# Patient Record
Sex: FEMALE | HISPANIC OR LATINO | Employment: OTHER | URBAN - METROPOLITAN AREA
[De-identification: names, ages, dates, MRNs, and addresses within clinical notes are randomized per-mention and may not be internally consistent; named-entity substitution may affect disease eponyms.]

---

## 2017-07-11 ENCOUNTER — HOSPITAL ENCOUNTER (OUTPATIENT)
Dept: MAMMOGRAPHY | Facility: HOSPITAL | Age: 72
Discharge: HOME | End: 2017-07-11
Attending: FAMILY MEDICINE | Admitting: FAMILY MEDICINE

## 2018-05-31 ENCOUNTER — HOSPITAL ENCOUNTER (OUTPATIENT)
Dept: CARDIOLOGY | Facility: HOSPITAL | Age: 73
Discharge: HOME | End: 2018-05-31
Attending: INTERNAL MEDICINE | Admitting: INTERNAL MEDICINE

## 2018-07-12 ENCOUNTER — HOSPITAL ENCOUNTER (OUTPATIENT)
Dept: MAMMOGRAPHY | Facility: HOSPITAL | Age: 73
Discharge: HOME | End: 2018-07-12
Attending: FAMILY MEDICINE | Admitting: FAMILY MEDICINE

## 2018-12-26 ENCOUNTER — HOSPITAL ENCOUNTER (OUTPATIENT)
Dept: GENERAL RADIOLOGY | Facility: HOSPITAL | Age: 73
Discharge: HOME | End: 2018-12-26
Attending: FAMILY MEDICINE | Admitting: FAMILY MEDICINE

## 2019-02-06 ENCOUNTER — HOSPITAL ENCOUNTER (OUTPATIENT)
Dept: PHYSICAL THERAPY | Facility: HOSPITAL | Age: 74
Setting detail: RECURRING SERIES
Discharge: HOME | End: 2019-03-07
Attending: PHYSICIAN ASSISTANT | Admitting: PHYSICIAN ASSISTANT

## 2019-03-26 ENCOUNTER — HOSPITAL ENCOUNTER (OUTPATIENT)
Dept: URGENT CARE | Facility: CLINIC | Age: 74
Discharge: HOME | End: 2019-03-26
Attending: FAMILY MEDICINE | Admitting: FAMILY MEDICINE

## 2019-06-01 ENCOUNTER — TRANSCRIBE ORDERS (OUTPATIENT)
Dept: ADMINISTRATIVE | Facility: HOSPITAL | Age: 74
End: 2019-06-01

## 2019-06-01 DIAGNOSIS — Z12.31 BREAST CANCER SCREENING BY MAMMOGRAM: Primary | ICD-10-CM

## 2019-06-01 DIAGNOSIS — Z91.89 PERSONAL HISTORY OF POISONING, PRESENTING HAZARDS TO HEALTH: Primary | ICD-10-CM

## 2019-06-01 DIAGNOSIS — Z12.39 SCREENING BREAST EXAMINATION: ICD-10-CM

## 2019-06-01 DIAGNOSIS — Z91.89 AT RISK FOR DECREASED BONE DENSITY: ICD-10-CM

## 2019-07-15 ENCOUNTER — HOSPITAL ENCOUNTER (OUTPATIENT)
Dept: MAMMOGRAPHY | Facility: HOSPITAL | Age: 74
Discharge: HOME | End: 2019-07-15

## 2019-07-15 ENCOUNTER — HOSPITAL ENCOUNTER (OUTPATIENT)
Dept: BONE DENSITY | Facility: HOSPITAL | Age: 74
Discharge: HOME | End: 2019-07-15
Admitting: FAMILY MEDICINE

## 2019-07-15 DIAGNOSIS — Z91.89 AT RISK FOR DECREASED BONE DENSITY: ICD-10-CM

## 2019-07-15 DIAGNOSIS — Z12.31 BREAST CANCER SCREENING BY MAMMOGRAM: ICD-10-CM

## 2019-07-15 PROCEDURE — 77067 SCR MAMMO BI INCL CAD: CPT

## 2019-07-15 PROCEDURE — 77063 BREAST TOMOSYNTHESIS BI: CPT

## 2019-07-15 PROCEDURE — 77080 DXA BONE DENSITY AXIAL: CPT

## 2019-09-09 ENCOUNTER — APPOINTMENT (OUTPATIENT)
Dept: MRI IMAGING | Facility: HOSPITAL | Age: 74
End: 2019-09-09

## 2019-09-09 ENCOUNTER — HOSPITAL ENCOUNTER (EMERGENCY)
Facility: HOSPITAL | Age: 74
Discharge: HOME | End: 2019-09-09
Attending: EMERGENCY MEDICINE | Admitting: EMERGENCY MEDICINE

## 2019-09-09 VITALS
RESPIRATION RATE: 14 BRPM | HEART RATE: 70 BPM | SYSTOLIC BLOOD PRESSURE: 136 MMHG | BODY MASS INDEX: 29.91 KG/M2 | HEIGHT: 59 IN | DIASTOLIC BLOOD PRESSURE: 57 MMHG | OXYGEN SATURATION: 97 % | WEIGHT: 148.37 LBS | TEMPERATURE: 97.8 F

## 2019-09-09 DIAGNOSIS — H81.10 BENIGN PAROXYSMAL POSITIONAL VERTIGO, UNSPECIFIED LATERALITY: Primary | ICD-10-CM

## 2019-09-09 DIAGNOSIS — E87.6 HYPOKALEMIA: ICD-10-CM

## 2019-09-09 LAB
ANION GAP SERPL CALCULATED.3IONS-SCNC: 13.4 MMOL/L (ref 5–15)
BASOPHILS # BLD AUTO: 0.1 10*3/MM3 (ref 0–0.2)
BASOPHILS NFR BLD AUTO: 0.6 % (ref 0–1.5)
BUN BLD-MCNC: 9 MG/DL (ref 8–20)
BUN/CREAT SERPL: 18 (ref 5.4–26.2)
CALCIUM SPEC-SCNC: 9.2 MG/DL (ref 8.9–10.3)
CHLORIDE SERPL-SCNC: 103 MMOL/L (ref 101–111)
CO2 SERPL-SCNC: 25 MMOL/L (ref 22–32)
CREAT BLD-MCNC: 0.5 MG/DL (ref 0.4–1)
DEPRECATED RDW RBC AUTO: 41.1 FL (ref 37–54)
EOSINOPHIL # BLD AUTO: 0.1 10*3/MM3 (ref 0–0.4)
EOSINOPHIL NFR BLD AUTO: 0.6 % (ref 0.3–6.2)
ERYTHROCYTE [DISTWIDTH] IN BLOOD BY AUTOMATED COUNT: 13.8 % (ref 12.3–15.4)
GFR SERPL CREATININE-BSD FRML MDRD: 121 ML/MIN/1.73
GLUCOSE BLD-MCNC: 167 MG/DL (ref 65–99)
HCT VFR BLD AUTO: 41.4 % (ref 34–46.6)
HGB BLD-MCNC: 14.1 G/DL (ref 12–15.9)
LYMPHOCYTES # BLD AUTO: 1.3 10*3/MM3 (ref 0.7–3.1)
LYMPHOCYTES NFR BLD AUTO: 11.5 % (ref 19.6–45.3)
MCH RBC QN AUTO: 28.3 PG (ref 26.6–33)
MCHC RBC AUTO-ENTMCNC: 33.9 G/DL (ref 31.5–35.7)
MCV RBC AUTO: 83.6 FL (ref 79–97)
MONOCYTES # BLD AUTO: 0.3 10*3/MM3 (ref 0.1–0.9)
MONOCYTES NFR BLD AUTO: 2.4 % (ref 5–12)
NEUTROPHILS # BLD AUTO: 9.4 10*3/MM3 (ref 1.7–7)
NEUTROPHILS NFR BLD AUTO: 84.9 % (ref 42.7–76)
NRBC BLD AUTO-RTO: 0.2 /100 WBC (ref 0–0.2)
PLATELET # BLD AUTO: 212 10*3/MM3 (ref 140–450)
PMV BLD AUTO: 9.1 FL (ref 6–12)
POTASSIUM BLD-SCNC: 3.4 MMOL/L (ref 3.6–5.1)
RBC # BLD AUTO: 4.96 10*6/MM3 (ref 3.77–5.28)
SODIUM BLD-SCNC: 138 MMOL/L (ref 136–144)
WBC NRBC COR # BLD: 11 10*3/MM3 (ref 3.4–10.8)

## 2019-09-09 PROCEDURE — 70551 MRI BRAIN STEM W/O DYE: CPT

## 2019-09-09 PROCEDURE — 99284 EMERGENCY DEPT VISIT MOD MDM: CPT

## 2019-09-09 PROCEDURE — 85025 COMPLETE CBC W/AUTO DIFF WBC: CPT | Performed by: EMERGENCY MEDICINE

## 2019-09-09 PROCEDURE — 80048 BASIC METABOLIC PNL TOTAL CA: CPT | Performed by: EMERGENCY MEDICINE

## 2019-09-09 RX ORDER — MECLIZINE HYDROCHLORIDE 25 MG/1
25 TABLET ORAL EVERY 6 HOURS PRN
Qty: 28 TABLET | Refills: 0 | Status: SHIPPED | OUTPATIENT
Start: 2019-09-09 | End: 2023-04-04

## 2019-09-09 RX ORDER — MECLIZINE HYDROCHLORIDE 25 MG/1
25 TABLET ORAL ONCE
Status: COMPLETED | OUTPATIENT
Start: 2019-09-09 | End: 2019-09-09

## 2019-09-09 RX ORDER — SODIUM CHLORIDE 0.9 % (FLUSH) 0.9 %
10 SYRINGE (ML) INJECTION AS NEEDED
Status: DISCONTINUED | OUTPATIENT
Start: 2019-09-09 | End: 2019-09-09 | Stop reason: HOSPADM

## 2019-09-09 RX ORDER — POTASSIUM CHLORIDE 20 MEQ/1
20 TABLET, EXTENDED RELEASE ORAL DAILY
Qty: 7 TABLET | Refills: 0 | Status: SHIPPED | OUTPATIENT
Start: 2019-09-09 | End: 2019-09-16

## 2019-09-09 RX ADMIN — MECLIZINE HYDROCLORIDE 25 MG: 25 TABLET ORAL at 16:23

## 2019-09-09 NOTE — ED PROVIDER NOTES
Subjective   History of Present Illness  Context: 72-year-old female presents with dizziness.  Daughter reports that she is off balance when she first gets up to start walking.  She complained of some spinning with lying flat she has had nausea no vomiting no headache.  She has had no speech difficulty no extremity numbness or weakness.  She does not complain of headache.  She states symptoms started this morning.  Location:  Quality: Dizziness  Duration: today  Timing: Constant  Severity: Moderate   Modifying factors: Worse with position change or lying flat  Associated signs and symptoms: Nausea    Review of Systems  For headache speech difficulty visual difficulty numbness weakness paresthesia of extremities hearing change or tinnitus  Past Medical History:   Diagnosis Date   • Breast cancer (CMS/HCC)     BOTH 15 YEARS   • TIA (transient ischemic attack) u       Allergies   Allergen Reactions   • Ibuprofen Hives   • Penicillins Hives       Past Surgical History:   Procedure Laterality Date   • BREAST BIOPSY     • BREAST LUMPECTOMY  15 YEARS AGO    BOTH BREASTS       History reviewed. No pertinent family history.    Social History     Socioeconomic History   • Marital status:      Spouse name: Not on file   • Number of children: Not on file   • Years of education: Not on file   • Highest education level: Not on file     Prior to Admission medications    Not on File     From review of records medications have included amlodipine aspirin Plavix metoprolol Crestor      Objective   Physical Exam  72-year-old female awake alert.  Generally well-developed malnourished.  Pupils equal round react to light.  Visual fields full.  Oropharynx clear neck supple chest clear equal breath sounds cardiovascular regular rate and rhythm abdomen soft nontender neurologic exam clear 2 through 12 grossly intact hands without pronator drift and is intact speech clear.  And I stroke scale 0.  Patient is noted to have positive  Garrison-Hallpike test with a nystagmus fast phase to the left.  Procedures           ED Course        Results for orders placed or performed during the hospital encounter of 09/09/19   Basic Metabolic Panel   Result Value Ref Range    Glucose 167 (H) 65 - 99 mg/dL    BUN 9 8 - 20 mg/dL    Creatinine 0.50 0.40 - 1.00 mg/dL    Sodium 138 136 - 144 mmol/L    Potassium 3.4 (L) 3.6 - 5.1 mmol/L    Chloride 103 101 - 111 mmol/L    CO2 25.0 22.0 - 32.0 mmol/L    Calcium 9.2 8.9 - 10.3 mg/dL    eGFR Non African Amer 121 >60 mL/min/1.73    BUN/Creatinine Ratio 18.0 5.4 - 26.2    Anion Gap 13.4 5.0 - 15.0 mmol/L   CBC Auto Differential   Result Value Ref Range    WBC 11.00 (H) 3.40 - 10.80 10*3/mm3    RBC 4.96 3.77 - 5.28 10*6/mm3    Hemoglobin 14.1 12.0 - 15.9 g/dL    Hematocrit 41.4 34.0 - 46.6 %    MCV 83.6 79.0 - 97.0 fL    MCH 28.3 26.6 - 33.0 pg    MCHC 33.9 31.5 - 35.7 g/dL    RDW 13.8 12.3 - 15.4 %    RDW-SD 41.1 37.0 - 54.0 fl    MPV 9.1 6.0 - 12.0 fL    Platelets 212 140 - 450 10*3/mm3    Neutrophil % 84.9 (H) 42.7 - 76.0 %    Lymphocyte % 11.5 (L) 19.6 - 45.3 %    Monocyte % 2.4 (L) 5.0 - 12.0 %    Eosinophil % 0.6 0.3 - 6.2 %    Basophil % 0.6 0.0 - 1.5 %    Neutrophils, Absolute 9.40 (H) 1.70 - 7.00 10*3/mm3    Lymphocytes, Absolute 1.30 0.70 - 3.10 10*3/mm3    Monocytes, Absolute 0.30 0.10 - 0.90 10*3/mm3    Eosinophils, Absolute 0.10 0.00 - 0.40 10*3/mm3    Basophils, Absolute 0.10 0.00 - 0.20 10*3/mm3    nRBC 0.2 0.0 - 0.2 /100 WBC     Mri Brain Without Contrast    Result Date: 9/9/2019   1. Stable exam, negative for acute ischemia. 2. Chronic signal abnormality mainly in the white matter is unchanged and most likely due to chronic small vessel disease.  Electronically Signed By-Madison Cohen On:9/9/2019 5:24 PM This report was finalized on [id number] by  Madison Cohen, .    Medications   sodium chloride 0.9 % flush 10 mL (not administered)   meclizine (ANTIVERT) tablet 25 mg (25 mg Oral Given 9/9/19 7048)  "    /70   Pulse 76   Temp 97.9 °F (36.6 °C) (Oral)   Resp 17   Ht 149.9 cm (59\")   Wt 67.3 kg (148 lb 5.9 oz)   SpO2 99%   Breastfeeding? No   BMI 29.97 kg/m²             [facility]  Chart review:   Comorbidity: As per past history  Differential: Benign positional vertigo, laryngitis, stroke  My EKG interpretation:   Lab: CBC without significant normality, basic metabolic panel glucose 167 potassium 3.4  Radiology: I reviewed MRI of brain.  No acute intracranial abnormality is noted.  There is some chronic signal abnormality in the white matter that is unchanged most likely chronic small vessel disease  Discussion/treatment: Patient was given Antivert.  Repeat evaluation she reports dizziness has improved.  Findings were discussed with her.  She was discharged.  She was placed on Antivert.  She was given ENT referral.  Advised to try to keep her head elevated avoid sudden position change return new or worsening symptoms    Final diagnoses:   Benign paroxysmal positional vertigo, unspecified laterality   Hypokalemia              Billy Jefferson MD  09/09/19 1820       Billy Jefferson MD  09/09/19 1823    "

## 2019-09-09 NOTE — DISCHARGE INSTRUCTIONS
Try to keep head elevated.  Avoid sudden position change follow-up with PMD or ENT for repeat evaluation if symptoms persist.  Return new or worsening symptoms

## 2019-10-22 ENCOUNTER — PROCEDURE VISIT (OUTPATIENT)
Dept: NEUROLOGY | Facility: CLINIC | Age: 74
End: 2019-10-22

## 2019-10-22 DIAGNOSIS — M79.602 PAIN OF LEFT UPPER EXTREMITY: Primary | ICD-10-CM

## 2019-10-22 PROCEDURE — 95908 NRV CNDJ TST 3-4 STUDIES: CPT | Performed by: PSYCHIATRY & NEUROLOGY

## 2019-10-22 PROCEDURE — 95886 MUSC TEST DONE W/N TEST COMP: CPT | Performed by: PSYCHIATRY & NEUROLOGY

## 2019-10-22 NOTE — PROGRESS NOTES
EMG and Nerve Conduction Studies    The complete report includes the data sheets.     Referring Doctor: Ricky Mcclure Md    Indication: Pain in Left Shoulder    Results:    1.  The left median sensory and motor NCS's were normal.    2.  The left  ulnar sensory and motor NCS's were normal.    3.  The muscles tested in the left  C5-T1 myotomes were normal.    Impression:    This is a normal study of the Left upper extremity.  .  There is no evidence of median or ulnar focal neuropathy or evidence of neurogenic change in the muscles tested in the C5-T1 myotomes.     Joseph Seipel, M.D.

## 2019-12-12 ENCOUNTER — APPOINTMENT (OUTPATIENT)
Dept: GENERAL RADIOLOGY | Facility: HOSPITAL | Age: 74
End: 2019-12-12

## 2019-12-12 ENCOUNTER — HOSPITAL ENCOUNTER (EMERGENCY)
Facility: HOSPITAL | Age: 74
Discharge: HOME | End: 2019-12-12
Admitting: EMERGENCY MEDICINE

## 2019-12-12 ENCOUNTER — APPOINTMENT (OUTPATIENT)
Dept: CARDIOLOGY | Facility: HOSPITAL | Age: 74
End: 2019-12-12

## 2019-12-12 VITALS
TEMPERATURE: 97.9 F | OXYGEN SATURATION: 100 % | HEIGHT: 60 IN | HEART RATE: 81 BPM | BODY MASS INDEX: 29.45 KG/M2 | DIASTOLIC BLOOD PRESSURE: 76 MMHG | SYSTOLIC BLOOD PRESSURE: 136 MMHG | WEIGHT: 150 LBS | RESPIRATION RATE: 16 BRPM

## 2019-12-12 DIAGNOSIS — D72.829 LEUKOCYTOSIS, UNSPECIFIED TYPE: ICD-10-CM

## 2019-12-12 DIAGNOSIS — M25.562 CHRONIC PAIN OF LEFT KNEE: ICD-10-CM

## 2019-12-12 DIAGNOSIS — M17.12 ARTHRITIS OF LEFT KNEE: Primary | ICD-10-CM

## 2019-12-12 DIAGNOSIS — G89.29 CHRONIC PAIN OF LEFT KNEE: ICD-10-CM

## 2019-12-12 LAB
BASOPHILS # BLD AUTO: 0.1 10*3/MM3 (ref 0–0.2)
BASOPHILS NFR BLD AUTO: 0.5 % (ref 0–1.5)
BH CV LOWER VASCULAR LEFT COMMON FEMORAL AUGMENT: NORMAL
BH CV LOWER VASCULAR LEFT COMMON FEMORAL COMPETENT: NORMAL
BH CV LOWER VASCULAR LEFT COMMON FEMORAL COMPRESS: NORMAL
BH CV LOWER VASCULAR LEFT COMMON FEMORAL PHASIC: NORMAL
BH CV LOWER VASCULAR LEFT COMMON FEMORAL SPONT: NORMAL
BH CV LOWER VASCULAR LEFT DISTAL FEMORAL COMPRESS: NORMAL
BH CV LOWER VASCULAR LEFT GASTRONEMIUS COMPRESS: NORMAL
BH CV LOWER VASCULAR LEFT GREATER SAPH AK COMPRESS: NORMAL
BH CV LOWER VASCULAR LEFT GREATER SAPH BK COMPRESS: NORMAL
BH CV LOWER VASCULAR LEFT LESSER SAPH COMPRESS: NORMAL
BH CV LOWER VASCULAR LEFT MID FEMORAL AUGMENT: NORMAL
BH CV LOWER VASCULAR LEFT MID FEMORAL COMPETENT: NORMAL
BH CV LOWER VASCULAR LEFT MID FEMORAL COMPRESS: NORMAL
BH CV LOWER VASCULAR LEFT MID FEMORAL PHASIC: NORMAL
BH CV LOWER VASCULAR LEFT MID FEMORAL SPONT: NORMAL
BH CV LOWER VASCULAR LEFT PERONEAL COMPRESS: NORMAL
BH CV LOWER VASCULAR LEFT POPLITEAL AUGMENT: NORMAL
BH CV LOWER VASCULAR LEFT POPLITEAL COMPETENT: NORMAL
BH CV LOWER VASCULAR LEFT POPLITEAL COMPRESS: NORMAL
BH CV LOWER VASCULAR LEFT POPLITEAL PHASIC: NORMAL
BH CV LOWER VASCULAR LEFT POPLITEAL SPONT: NORMAL
BH CV LOWER VASCULAR LEFT POSTERIOR TIBIAL COMPRESS: NORMAL
BH CV LOWER VASCULAR LEFT PROXIMAL FEMORAL COMPRESS: NORMAL
BH CV LOWER VASCULAR LEFT SAPHENOFEMORAL JUNCTION AUGMENT: NORMAL
BH CV LOWER VASCULAR LEFT SAPHENOFEMORAL JUNCTION COMPETENT: NORMAL
BH CV LOWER VASCULAR LEFT SAPHENOFEMORAL JUNCTION COMPRESS: NORMAL
BH CV LOWER VASCULAR LEFT SAPHENOFEMORAL JUNCTION PHASIC: NORMAL
BH CV LOWER VASCULAR LEFT SAPHENOFEMORAL JUNCTION SPONT: NORMAL
BH CV LOWER VASCULAR RIGHT COMMON FEMORAL AUGMENT: NORMAL
BH CV LOWER VASCULAR RIGHT COMMON FEMORAL COMPETENT: NORMAL
BH CV LOWER VASCULAR RIGHT COMMON FEMORAL COMPRESS: NORMAL
BH CV LOWER VASCULAR RIGHT COMMON FEMORAL PHASIC: NORMAL
BH CV LOWER VASCULAR RIGHT COMMON FEMORAL SPONT: NORMAL
DEPRECATED RDW RBC AUTO: 41.1 FL (ref 37–54)
EOSINOPHIL # BLD AUTO: 0.1 10*3/MM3 (ref 0–0.4)
EOSINOPHIL NFR BLD AUTO: 1.1 % (ref 0.3–6.2)
ERYTHROCYTE [DISTWIDTH] IN BLOOD BY AUTOMATED COUNT: 13.9 % (ref 12.3–15.4)
ERYTHROCYTE [SEDIMENTATION RATE] IN BLOOD: 22 MM/HR (ref 0–30)
HCT VFR BLD AUTO: 38.9 % (ref 34–46.6)
HGB BLD-MCNC: 13.5 G/DL (ref 12–15.9)
LYMPHOCYTES # BLD AUTO: 2.2 10*3/MM3 (ref 0.7–3.1)
LYMPHOCYTES NFR BLD AUTO: 16.4 % (ref 19.6–45.3)
MCH RBC QN AUTO: 29.1 PG (ref 26.6–33)
MCHC RBC AUTO-ENTMCNC: 34.7 G/DL (ref 31.5–35.7)
MCV RBC AUTO: 83.7 FL (ref 79–97)
MONOCYTES # BLD AUTO: 0.9 10*3/MM3 (ref 0.1–0.9)
MONOCYTES NFR BLD AUTO: 6.6 % (ref 5–12)
NEUTROPHILS # BLD AUTO: 9.9 10*3/MM3 (ref 1.7–7)
NEUTROPHILS NFR BLD AUTO: 75.4 % (ref 42.7–76)
NRBC BLD AUTO-RTO: 0.3 /100 WBC (ref 0–0.2)
PLATELET # BLD AUTO: 205 10*3/MM3 (ref 140–450)
PMV BLD AUTO: 9.5 FL (ref 6–12)
RBC # BLD AUTO: 4.64 10*6/MM3 (ref 3.77–5.28)
WBC NRBC COR # BLD: 13.2 10*3/MM3 (ref 3.4–10.8)

## 2019-12-12 PROCEDURE — 73560 X-RAY EXAM OF KNEE 1 OR 2: CPT

## 2019-12-12 PROCEDURE — 99283 EMERGENCY DEPT VISIT LOW MDM: CPT

## 2019-12-12 PROCEDURE — 85652 RBC SED RATE AUTOMATED: CPT | Performed by: NURSE PRACTITIONER

## 2019-12-12 PROCEDURE — 93971 EXTREMITY STUDY: CPT

## 2019-12-12 PROCEDURE — 85025 COMPLETE CBC W/AUTO DIFF WBC: CPT | Performed by: NURSE PRACTITIONER

## 2019-12-12 RX ORDER — BENZONATATE 100 MG/1
100 CAPSULE ORAL 3 TIMES DAILY PRN
Qty: 9 CAPSULE | Refills: 0 | Status: SHIPPED | OUTPATIENT
Start: 2019-12-12 | End: 2019-12-15

## 2019-12-12 RX ORDER — ACETAMINOPHEN 500 MG
1000 TABLET ORAL ONCE
Status: COMPLETED | OUTPATIENT
Start: 2019-12-12 | End: 2019-12-12

## 2019-12-12 RX ADMIN — ACETAMINOPHEN 1000 MG: 500 TABLET, FILM COATED ORAL at 15:06

## 2019-12-12 NOTE — ED PROVIDER NOTES
Subjective   Patient is a 74-year-old female comes in with complaints of left knee pain swelling started last night recall any injury.  No fever no chest pain no shortness of breath.  Has been trying CBD oil only used it one time did help.  Allergic to penicillin and ibuprofen.  Has a history of an injury to the left foot and is currently seeing orthopedic for this.  Also complains of a cough states is been 2 on 2 rounds of antibiotics for the cough tells me this after thing is done and patient is about to be discharged we will give Tessalon Perles      History provided by:  Patient  Knee Pain   Location:  Knee  Knee location:  L knee  Pain details:     Quality:  Aching    Radiates to:  Does not radiate    Severity:  Moderate    Onset quality:  Gradual    Duration:  1 day    Timing:  Constant  Chronicity:  Recurrent  Dislocation: no    Foreign body present:  No foreign bodies  Prior injury to area:  No  Relieved by:  None tried  Ineffective treatments:  None tried  Associated symptoms: decreased ROM    Associated symptoms: no back pain, no fatigue, no fever, no neck pain and no numbness        Review of Systems   Constitutional: Negative for activity change, appetite change, fatigue and fever.   HENT: Negative for congestion and trouble swallowing.    Eyes: Negative for discharge and redness.   Respiratory: Positive for cough. Negative for apnea, chest tightness, shortness of breath and stridor.    Cardiovascular: Negative for chest pain, palpitations and leg swelling.   Gastrointestinal: Negative for abdominal distention, abdominal pain and nausea.   Musculoskeletal: Negative for back pain, joint swelling and neck pain.        Left knee swelling pain no redness   Skin: Negative for color change, pallor and rash.   Neurological: Negative for dizziness and numbness.       Past Medical History:   Diagnosis Date   • Breast cancer (CMS/HCC)     BOTH 15 YEARS   • TIA (transient ischemic attack) u       Allergies    Allergen Reactions   • Ibuprofen Hives   • Penicillins Hives       Past Surgical History:   Procedure Laterality Date   • BREAST BIOPSY     • BREAST LUMPECTOMY  15 YEARS AGO    BOTH BREASTS       History reviewed. No pertinent family history.    Social History     Socioeconomic History   • Marital status:      Spouse name: Not on file   • Number of children: Not on file   • Years of education: Not on file   • Highest education level: Not on file   Tobacco Use   • Smoking status: Former Smoker   • Smokeless tobacco: Never Used   Substance and Sexual Activity   • Alcohol use: Not Currently   • Drug use: Not Currently           Objective   Physical Exam   Constitutional: She is oriented to person, place, and time. She appears well-developed and well-nourished. No distress.   HENT:   Head: Normocephalic and atraumatic.   Eyes: Pupils are equal, round, and reactive to light. Conjunctivae and EOM are normal.   Neck: Normal range of motion. Neck supple.   Cardiovascular: Normal rate and regular rhythm.   Pulmonary/Chest: Effort normal and breath sounds normal. No stridor. No respiratory distress. She has no wheezes. She has no rales. She exhibits no tenderness.   Abdominal: Soft. Bowel sounds are normal.   Musculoskeletal: She exhibits edema and tenderness. She exhibits no deformity.        Left knee: She exhibits decreased range of motion, swelling and LCL laxity. She exhibits no ecchymosis, no deformity, no erythema, normal alignment, normal patellar mobility, no bony tenderness and normal meniscus. Tenderness found.        Legs:  Neurological: She is alert and oriented to person, place, and time.   Skin: Skin is warm and dry. She is not diaphoretic.   Psychiatric: She has a normal mood and affect. Her behavior is normal. Judgment and thought content normal.       Procedures           ED Course  ED Course as of Dec 12 1647   Thu Dec 12, 2019   1530 CBC WBC 13.20    [JM]   1611 X-ray showed small effusion  arthritis changes    []   1612 Doppler ultrasound negative for DVT    []   1637 Sed rate normal    []      ED Course User Index  [] Katie Ramos, APRN                      No data recorded                        MDM  Number of Diagnoses or Management Options  Arthritis of left knee: minor  Chronic pain of left knee: minor  Leukocytosis, unspecified type: minor  Diagnosis management comments: Disposition: Discharged.  Stable nontoxic in appearance patient states upon discharge that the RN she can give anything for the cough was given Tessalon Perles was on 2 rounds of antibiotics biotics this visit is not warranted he is to follow-up with her family doctor verbalized understanding    Patient discharged in stable condition.    Reviewed implications of results, diagnosis, meds, responsibility to follow up, warning signs and symptoms of possible worsening, potential complications and reasons to return to ER, including     Patient/Family voiced understanding of above instructions.    Discussed plan for discharge, as there is no emergent indication for admission.  Pt/family is agreeable and understands need for follow up and repeat testing.  Pt is aware that discharge does not mean that nothing is wrong but it indicates no emergency is present and they must continue care with follow-up as given below or physician of their choice.     FOLLOW-UP  Jet Santana MD[address] [facility] [address] [phone] an appointment as soon as possible for a visit in 2 daysIf symptoms worsen       Medication List      New Prescriptions    benzonatate 100 MG capsule  Commonly known as:  TESSALON  Take 1 capsule by mouth 3 (Three) Times a Day As Needed for Cough for up   to 3 days.               Amount and/or Complexity of Data Reviewed  Clinical lab tests: reviewed  Tests in the radiology section of CPT®: reviewed        Final diagnoses:   Arthritis of left knee   Chronic pain of left knee    Leukocytosis, unspecified type              Katie Ramos, APRN  12/12/19 [id number]

## 2020-02-25 PROBLEM — I67.82 TEMPORARY CEREBRAL VASCULAR DYSFUNCTION: Status: ACTIVE | Noted: 2018-05-18

## 2020-02-25 PROBLEM — E78.2 MULTIPLE-TYPE HYPERLIPIDEMIA: Status: ACTIVE | Noted: 2018-05-11

## 2020-02-25 PROBLEM — I10 ESSENTIAL HYPERTENSION: Status: ACTIVE | Noted: 2018-05-11

## 2020-02-25 RX ORDER — ASPIRIN 81 MG/1
TABLET, CHEWABLE ORAL
COMMUNITY
Start: 2018-05-18

## 2020-03-09 ENCOUNTER — OFFICE VISIT (OUTPATIENT)
Dept: CARDIOLOGY | Facility: CLINIC | Age: 75
End: 2020-03-09

## 2020-03-09 VITALS
WEIGHT: 153 LBS | HEIGHT: 60 IN | SYSTOLIC BLOOD PRESSURE: 148 MMHG | DIASTOLIC BLOOD PRESSURE: 80 MMHG | HEART RATE: 80 BPM | BODY MASS INDEX: 30.04 KG/M2 | OXYGEN SATURATION: 96 %

## 2020-03-09 DIAGNOSIS — I10 ESSENTIAL HYPERTENSION: ICD-10-CM

## 2020-03-09 DIAGNOSIS — E78.2 MULTIPLE-TYPE HYPERLIPIDEMIA: Primary | ICD-10-CM

## 2020-03-09 PROCEDURE — 99213 OFFICE O/P EST LOW 20 MIN: CPT | Performed by: INTERNAL MEDICINE

## 2020-03-09 RX ORDER — AMLODIPINE BESYLATE 10 MG/1
TABLET ORAL
COMMUNITY
Start: 2020-01-30

## 2020-03-09 RX ORDER — CLOPIDOGREL BISULFATE 75 MG/1
TABLET ORAL
COMMUNITY
Start: 2019-12-30

## 2020-03-09 RX ORDER — ROSUVASTATIN CALCIUM 20 MG/1
TABLET, COATED ORAL
COMMUNITY
Start: 2020-02-10 | End: 2023-04-04

## 2020-03-09 RX ORDER — ERGOCALCIFEROL 1.25 MG/1
CAPSULE ORAL
COMMUNITY
Start: 2020-01-05

## 2020-03-09 NOTE — PROGRESS NOTES
"    Subjective:     Encounter Date:03/09/2020      Patient ID: Lidia Rick is a 74 y.o. female.    Chief Complaint: HTN  History of Present Illness 74-year-old white female with history of hypertension diabetes hyperlipidemia and history of CVA in the past presents my office for follow-up.  Patient is currently stable without incidence of chest pain or shortness of breath at rest but has some shortness of breath with exertion.  No complains of any PND orthopnea.  No palpitation dizziness syncope or swelling of the feet.  Patient has been taking all the medicines regularly.  Patient does not smoke.  She is trying to exercise regularly.  She does not follow a good diet.    The following portions of the patient's history were reviewed and updated as appropriate: allergies, current medications, past family history, past medical history, past social history, past surgical history and problem list.  Past Medical History:   Diagnosis Date   • Breast cancer (CMS/HCC)     BOTH 15 YEARS   • TIA (transient ischemic attack) u     Past Surgical History:   Procedure Laterality Date   • BREAST BIOPSY     • BREAST LUMPECTOMY  15 YEARS AGO    BOTH BREASTS     /80 (BP Location: Right arm, Patient Position: Sitting, Cuff Size: Adult)   Pulse 80   Ht 152.4 cm (60\")   Wt 69.4 kg (153 lb)   SpO2 96%   BMI 29.88 kg/m²   History reviewed. No pertinent family history.    Current Outpatient Medications:   •  amLODIPine (NORVASC) 10 MG tablet, , Disp: , Rfl:   •  aspirin 81 MG chewable tablet, aspirin 81 mg chewable tablet  CHEW ONE TABLET BY MOUTH DAILY, Disp: , Rfl:   •  clopidogrel (PLAVIX) 75 MG tablet, , Disp: , Rfl:   •  metFORMIN (GLUCOPHAGE) 500 MG tablet, metformin 500 mg tablet  1/2 tab by mouth twice daily, Disp: , Rfl:   •  metoprolol tartrate (LOPRESSOR) 25 MG tablet, , Disp: , Rfl:   •  rosuvastatin (CRESTOR) 20 MG tablet, , Disp: , Rfl:   •  vitamin D (ERGOCALCIFEROL) 1.25 MG (22171 UT) capsule capsule, , Disp: " , Rfl:   •  meclizine (ANTIVERT) 25 MG tablet, Take 1 tablet by mouth Every 6 (Six) Hours As Needed for dizziness., Disp: 28 tablet, Rfl: 0  Allergies   Allergen Reactions   • Ibuprofen Hives   • Penicillins Hives   • Statins Unknown - High Severity     Social History     Socioeconomic History   • Marital status:      Spouse name: Not on file   • Number of children: Not on file   • Years of education: Not on file   • Highest education level: Not on file   Tobacco Use   • Smoking status: Former Smoker   • Smokeless tobacco: Never Used   Substance and Sexual Activity   • Alcohol use: Not Currently   • Drug use: Not Currently     Review of Systems   Constitution: Negative for fever and malaise/fatigue.   HENT: Negative for congestion and hearing loss.    Eyes: Negative for double vision and visual disturbance.   Cardiovascular: Negative for chest pain, claudication, dyspnea on exertion, leg swelling, palpitations and syncope.   Respiratory: Positive for shortness of breath. Negative for cough.    Endocrine: Negative for cold intolerance.   Skin: Negative for color change and rash.   Musculoskeletal: Negative for arthritis and joint pain.   Gastrointestinal: Negative for abdominal pain, heartburn, nausea and vomiting.   Genitourinary: Negative for hematuria.   Neurological: Negative for excessive daytime sleepiness, dizziness, focal weakness and headaches.   Psychiatric/Behavioral: Negative for depression. The patient is not nervous/anxious.    All other systems reviewed and are negative.             Objective:     Physical Exam   Constitutional: She appears well-developed and well-nourished.   HENT:   Head: Normocephalic and atraumatic.   Eyes: Conjunctivae are normal. No scleral icterus.   Neck: Normal range of motion. Neck supple. No JVD present. Carotid bruit is not present.   Cardiovascular: Normal rate, regular rhythm, S1 normal, S2 normal, normal heart sounds and intact distal pulses. PMI is not displaced.    Pulmonary/Chest: Effort normal and breath sounds normal. She has no wheezes. She has no rales.   Abdominal: Soft. Bowel sounds are normal.   Neurological: She is alert. She has normal strength.   Skin: Skin is warm and dry. No rash noted.     Procedures    Lab Review:       Assessment:          Diagnosis Plan   1. Multiple-type hyperlipidemia     2. Essential hypertension            Plan:       Patient has history of hypertension and blood pressures currently stable on medical therapy  Patient also has hyperlipidemia and is on a statin  Patient has diabetes and is currently on metformin  Patient has history of TIA/CVA and is currently on medical therapy  Continue current medicines and follow-up in 1

## 2020-07-22 ENCOUNTER — TRANSCRIBE ORDERS (OUTPATIENT)
Dept: ADMINISTRATIVE | Facility: HOSPITAL | Age: 75
End: 2020-07-22

## 2020-07-22 DIAGNOSIS — Z12.39 SCREENING BREAST EXAMINATION: Primary | ICD-10-CM

## 2020-07-29 ENCOUNTER — HOSPITAL ENCOUNTER (OUTPATIENT)
Dept: MAMMOGRAPHY | Facility: HOSPITAL | Age: 75
Discharge: HOME | End: 2020-07-29
Admitting: FAMILY MEDICINE

## 2020-07-29 DIAGNOSIS — Z12.39 SCREENING BREAST EXAMINATION: ICD-10-CM

## 2020-07-29 PROCEDURE — 77063 BREAST TOMOSYNTHESIS BI: CPT

## 2020-07-29 PROCEDURE — 77067 SCR MAMMO BI INCL CAD: CPT

## 2021-11-24 ENCOUNTER — ON CAMPUS - OUTPATIENT (AMBULATORY)
Dept: URBAN - METROPOLITAN AREA HOSPITAL 2 | Facility: HOSPITAL | Age: 76
End: 2021-11-24

## 2021-11-24 ENCOUNTER — OFFICE (AMBULATORY)
Dept: URBAN - METROPOLITAN AREA PATHOLOGY 4 | Facility: PATHOLOGY | Age: 76
End: 2021-11-24

## 2021-11-24 VITALS
SYSTOLIC BLOOD PRESSURE: 134 MMHG | DIASTOLIC BLOOD PRESSURE: 70 MMHG | DIASTOLIC BLOOD PRESSURE: 89 MMHG | HEART RATE: 84 BPM | SYSTOLIC BLOOD PRESSURE: 105 MMHG | DIASTOLIC BLOOD PRESSURE: 81 MMHG | SYSTOLIC BLOOD PRESSURE: 133 MMHG | RESPIRATION RATE: 20 BRPM | SYSTOLIC BLOOD PRESSURE: 142 MMHG | HEART RATE: 79 BPM | OXYGEN SATURATION: 98 % | SYSTOLIC BLOOD PRESSURE: 129 MMHG | DIASTOLIC BLOOD PRESSURE: 51 MMHG | OXYGEN SATURATION: 99 % | RESPIRATION RATE: 15 BRPM | HEIGHT: 61 IN | DIASTOLIC BLOOD PRESSURE: 75 MMHG | HEART RATE: 83 BPM | RESPIRATION RATE: 16 BRPM | TEMPERATURE: 97 F | HEART RATE: 80 BPM | DIASTOLIC BLOOD PRESSURE: 72 MMHG | HEART RATE: 95 BPM | RESPIRATION RATE: 18 BRPM | WEIGHT: 149 LBS | HEART RATE: 87 BPM | SYSTOLIC BLOOD PRESSURE: 108 MMHG | OXYGEN SATURATION: 100 % | HEART RATE: 104 BPM | DIASTOLIC BLOOD PRESSURE: 57 MMHG | HEART RATE: 98 BPM | RESPIRATION RATE: 19 BRPM

## 2021-11-24 DIAGNOSIS — K21.00 GASTRO-ESOPHAGEAL REFLUX DISEASE WITH ESOPHAGITIS, WITHOUT B: ICD-10-CM

## 2021-11-24 DIAGNOSIS — K29.50 UNSPECIFIED CHRONIC GASTRITIS WITHOUT BLEEDING: ICD-10-CM

## 2021-11-24 DIAGNOSIS — K22.2 ESOPHAGEAL OBSTRUCTION: ICD-10-CM

## 2021-11-24 DIAGNOSIS — K57.30 DIVERTICULOSIS OF LARGE INTESTINE WITHOUT PERFORATION OR ABS: ICD-10-CM

## 2021-11-24 DIAGNOSIS — D12.2 BENIGN NEOPLASM OF ASCENDING COLON: ICD-10-CM

## 2021-11-24 DIAGNOSIS — Z12.11 ENCOUNTER FOR SCREENING FOR MALIGNANT NEOPLASM OF COLON: ICD-10-CM

## 2021-11-24 DIAGNOSIS — K44.9 DIAPHRAGMATIC HERNIA WITHOUT OBSTRUCTION OR GANGRENE: ICD-10-CM

## 2021-11-24 PROBLEM — K63.5 POLYP OF COLON: Status: ACTIVE | Noted: 2021-11-24

## 2021-11-24 LAB
GI HISTOLOGY: A. SELECT: (no result)
GI HISTOLOGY: B. UNSPECIFIED: (no result)
GI HISTOLOGY: PDF REPORT: (no result)

## 2021-11-24 PROCEDURE — 45385 COLONOSCOPY W/LESION REMOVAL: CPT | Mod: PT | Performed by: INTERNAL MEDICINE

## 2021-11-24 PROCEDURE — 88305 TISSUE EXAM BY PATHOLOGIST: CPT | Mod: 26 | Performed by: INTERNAL MEDICINE

## 2021-11-24 PROCEDURE — 88342 IMHCHEM/IMCYTCHM 1ST ANTB: CPT | Mod: 26 | Performed by: INTERNAL MEDICINE

## 2021-11-24 PROCEDURE — 43239 EGD BIOPSY SINGLE/MULTIPLE: CPT | Performed by: INTERNAL MEDICINE

## 2021-11-24 PROCEDURE — 43450 DILATE ESOPHAGUS 1/MULT PASS: CPT | Performed by: INTERNAL MEDICINE

## 2021-11-24 RX ORDER — OMEPRAZOLE 40 MG/1
40 CAPSULE, DELAYED RELEASE ORAL
Qty: 90 | Refills: 11 | Status: COMPLETED
Start: 2021-11-24 | End: 2022-05-18

## 2022-03-01 ENCOUNTER — OFFICE (AMBULATORY)
Dept: URBAN - METROPOLITAN AREA CLINIC 64 | Facility: CLINIC | Age: 77
End: 2022-03-01

## 2022-03-01 VITALS
SYSTOLIC BLOOD PRESSURE: 141 MMHG | DIASTOLIC BLOOD PRESSURE: 75 MMHG | HEART RATE: 90 BPM | HEIGHT: 61 IN | WEIGHT: 155 LBS

## 2022-03-01 DIAGNOSIS — K21.9 GASTRO-ESOPHAGEAL REFLUX DISEASE WITHOUT ESOPHAGITIS: ICD-10-CM

## 2022-03-01 PROCEDURE — 99213 OFFICE O/P EST LOW 20 MIN: CPT | Performed by: INTERNAL MEDICINE

## 2022-03-01 RX ORDER — OMEPRAZOLE 40 MG/1
40 CAPSULE, DELAYED RELEASE ORAL
Qty: 90 | Refills: 11 | Status: COMPLETED
Start: 2021-11-24 | End: 2022-05-18

## 2022-03-11 ENCOUNTER — HOSPITAL ENCOUNTER (EMERGENCY)
Facility: HOSPITAL | Age: 77
Discharge: HOME | End: 2022-03-11
Attending: EMERGENCY MEDICINE | Admitting: EMERGENCY MEDICINE

## 2022-03-11 ENCOUNTER — APPOINTMENT (OUTPATIENT)
Dept: CT IMAGING | Facility: HOSPITAL | Age: 77
End: 2022-03-11

## 2022-03-11 VITALS
OXYGEN SATURATION: 96 % | BODY MASS INDEX: 28.89 KG/M2 | TEMPERATURE: 98 F | RESPIRATION RATE: 19 BRPM | HEIGHT: 61 IN | DIASTOLIC BLOOD PRESSURE: 63 MMHG | HEART RATE: 73 BPM | WEIGHT: 153 LBS | SYSTOLIC BLOOD PRESSURE: 125 MMHG

## 2022-03-11 DIAGNOSIS — R53.1 WEAKNESS: ICD-10-CM

## 2022-03-11 DIAGNOSIS — R42 DIZZINESS: Primary | ICD-10-CM

## 2022-03-11 LAB
ANION GAP SERPL CALCULATED.3IONS-SCNC: 13 MMOL/L (ref 5–15)
BASOPHILS # BLD AUTO: 0.1 10*3/MM3 (ref 0–0.2)
BASOPHILS NFR BLD AUTO: 0.6 % (ref 0–1.5)
BUN SERPL-MCNC: 8 MG/DL (ref 8–23)
BUN/CREAT SERPL: 11.9 (ref 7–25)
CALCIUM SPEC-SCNC: 9.5 MG/DL (ref 8.6–10.5)
CHLORIDE SERPL-SCNC: 102 MMOL/L (ref 98–107)
CO2 SERPL-SCNC: 25 MMOL/L (ref 22–29)
CREAT SERPL-MCNC: 0.67 MG/DL (ref 0.57–1)
DEPRECATED RDW RBC AUTO: 40.7 FL (ref 37–54)
EGFRCR SERPLBLD CKD-EPI 2021: 91.8 ML/MIN/1.73
EOSINOPHIL # BLD AUTO: 0.2 10*3/MM3 (ref 0–0.4)
EOSINOPHIL NFR BLD AUTO: 2.2 % (ref 0.3–6.2)
ERYTHROCYTE [DISTWIDTH] IN BLOOD BY AUTOMATED COUNT: 13.7 % (ref 12.3–15.4)
GLUCOSE SERPL-MCNC: 132 MG/DL (ref 65–99)
HCT VFR BLD AUTO: 39.8 % (ref 34–46.6)
HGB BLD-MCNC: 13.8 G/DL (ref 12–15.9)
LYMPHOCYTES # BLD AUTO: 2 10*3/MM3 (ref 0.7–3.1)
LYMPHOCYTES NFR BLD AUTO: 19.6 % (ref 19.6–45.3)
MCH RBC QN AUTO: 29.2 PG (ref 26.6–33)
MCHC RBC AUTO-ENTMCNC: 34.6 G/DL (ref 31.5–35.7)
MCV RBC AUTO: 84.3 FL (ref 79–97)
MONOCYTES # BLD AUTO: 0.4 10*3/MM3 (ref 0.1–0.9)
MONOCYTES NFR BLD AUTO: 4.3 % (ref 5–12)
NEUTROPHILS NFR BLD AUTO: 7.3 10*3/MM3 (ref 1.7–7)
NEUTROPHILS NFR BLD AUTO: 73.3 % (ref 42.7–76)
NRBC BLD AUTO-RTO: 0.1 /100 WBC (ref 0–0.2)
PLATELET # BLD AUTO: 211 10*3/MM3 (ref 140–450)
PMV BLD AUTO: 9.3 FL (ref 6–12)
POTASSIUM SERPL-SCNC: 3.8 MMOL/L (ref 3.5–5.2)
RBC # BLD AUTO: 4.72 10*6/MM3 (ref 3.77–5.28)
SODIUM SERPL-SCNC: 140 MMOL/L (ref 136–145)
WBC NRBC COR # BLD: 10 10*3/MM3 (ref 3.4–10.8)

## 2022-03-11 PROCEDURE — 80048 BASIC METABOLIC PNL TOTAL CA: CPT | Performed by: EMERGENCY MEDICINE

## 2022-03-11 PROCEDURE — 85025 COMPLETE CBC W/AUTO DIFF WBC: CPT | Performed by: EMERGENCY MEDICINE

## 2022-03-11 PROCEDURE — 70450 CT HEAD/BRAIN W/O DYE: CPT

## 2022-03-11 PROCEDURE — 99283 EMERGENCY DEPT VISIT LOW MDM: CPT

## 2022-03-11 RX ORDER — SODIUM CHLORIDE 0.9 % (FLUSH) 0.9 %
10 SYRINGE (ML) INJECTION AS NEEDED
Status: DISCONTINUED | OUTPATIENT
Start: 2022-03-11 | End: 2022-03-11 | Stop reason: HOSPADM

## 2022-03-12 NOTE — ED PROVIDER NOTES
Subjective   Patient is a 76-year-old female who states an episode today of dizziness and mild weakness to her left arm.  She states this lasted for several minutes.  She states she has had this happen several times in the past and was diagnosed with a TIA.  She has no complaints at this time other than very mild headache.          Review of Systems  Negative for previous headache earache sore throat neck pain cough fever chest pain shortness of breath Kristian diarrhea dysuria denies weight loss or other complaint.  A complete system was obtained and is otherwise negative  Past Medical History:   Diagnosis Date   • Breast cancer (CMS/HCC)     BOTH 15 YEARS   • TIA (transient ischemic attack) u       Allergies   Allergen Reactions   • Ibuprofen Hives   • Penicillins Hives   • Statins Unknown - High Severity       Past Surgical History:   Procedure Laterality Date   • BREAST BIOPSY     • BREAST LUMPECTOMY  15 YEARS AGO    BOTH BREASTS       No family history on file.    Social History     Socioeconomic History   • Marital status:    Tobacco Use   • Smoking status: Former Smoker   • Smokeless tobacco: Never Used   Substance and Sexual Activity   • Alcohol use: Not Currently   • Drug use: Not Currently           Objective   Physical Exam  Neurologic exam is nonfocal.  HEENT exam shows TMs to be clear.  Oropharynx clear moist but sclerae nonicteric.  Neck has no adenopathy JVD or bruits.  Lungs are clear.  Heart has a regular rhythm without murmur or gallop.  Chest is nontender.  Abdomen is soft nontender.  Extremity exam is unremarkable.  Procedures           ED Course            Results for orders placed or performed during the hospital encounter of 03/11/22   Basic Metabolic Panel    Specimen: Blood   Result Value Ref Range    Glucose 132 (H) 65 - 99 mg/dL    BUN 8 8 - 23 mg/dL    Creatinine 0.67 0.57 - 1.00 mg/dL    Sodium 140 136 - 145 mmol/L    Potassium 3.8 3.5 - 5.2 mmol/L    Chloride 102 98 - 107 mmol/L     CO2 25.0 22.0 - 29.0 mmol/L    Calcium 9.5 8.6 - 10.5 mg/dL    BUN/Creatinine Ratio 11.9 7.0 - 25.0    Anion Gap 13.0 5.0 - 15.0 mmol/L    eGFR 91.8 >60.0 mL/min/1.73   CBC Auto Differential    Specimen: Blood   Result Value Ref Range    WBC 10.00 3.40 - 10.80 10*3/mm3    RBC 4.72 3.77 - 5.28 10*6/mm3    Hemoglobin 13.8 12.0 - 15.9 g/dL    Hematocrit 39.8 34.0 - 46.6 %    MCV 84.3 79.0 - 97.0 fL    MCH 29.2 26.6 - 33.0 pg    MCHC 34.6 31.5 - 35.7 g/dL    RDW 13.7 12.3 - 15.4 %    RDW-SD 40.7 37.0 - 54.0 fl    MPV 9.3 6.0 - 12.0 fL    Platelets 211 140 - 450 10*3/mm3    Neutrophil % 73.3 42.7 - 76.0 %    Lymphocyte % 19.6 19.6 - 45.3 %    Monocyte % 4.3 (L) 5.0 - 12.0 %    Eosinophil % 2.2 0.3 - 6.2 %    Basophil % 0.6 0.0 - 1.5 %    Neutrophils, Absolute 7.30 (H) 1.70 - 7.00 10*3/mm3    Lymphocytes, Absolute 2.00 0.70 - 3.10 10*3/mm3    Monocytes, Absolute 0.40 0.10 - 0.90 10*3/mm3    Eosinophils, Absolute 0.20 0.00 - 0.40 10*3/mm3    Basophils, Absolute 0.10 0.00 - 0.20 10*3/mm3    nRBC 0.1 0.0 - 0.2 /100 WBC     CT Head Without Contrast    Result Date: 3/11/2022  1. Stable exam, with no acute abnormality identified. 2. There is mild cerebral and cerebellar atrophy and minor chronic white matter disease..  Electronically Signed By-Madison Cohen MD On:3/11/2022 7:53 PM This report was finalized on [id number] by  Madison Cohen MD.                                            MDM  Number of Diagnoses or Management Options  Diagnosis management comments: Patient is benign physical exam.  She has no focal neurologic deficits.  CT scan of head without contrast shows no acute abnormality.  Metabolic panel is at baseline.  There is no evidence acute infectious process.  On reexamination patient sasha at her baseline without complaint.  She will be discharged to follow with MD for further outpatient evaluation as needed.       Amount and/or Complexity of Data Reviewed  Clinical lab tests: reviewed  Tests in the radiology  section of CPT®: reviewed    Risk of Complications, Morbidity, and/or Mortality  Presenting problems: high  Diagnostic procedures: high  Management options: high    Patient Progress  Patient progress: stable      Final diagnoses:   Dizziness   Weakness       ED Disposition  ED Disposition     ED Disposition   Discharge    Condition   Stable    Comment   --             No follow-up provider specified.       Medication List      No changes were made to your prescriptions during this visit.          Michael Arias MD  03/11/22 2025

## 2022-03-19 PROBLEM — K22.2 ESOPHAGEAL OBSTRUCTION: Status: ACTIVE | Noted: 2021-11-24

## 2022-03-19 PROBLEM — K44.9 DIAPHRAGMATIC HERNIA WITHOUT OBSTRUCTION OR GANGRENE: Status: ACTIVE | Noted: 2021-11-24

## 2022-05-18 ENCOUNTER — OFFICE (AMBULATORY)
Dept: URBAN - METROPOLITAN AREA CLINIC 64 | Facility: CLINIC | Age: 77
End: 2022-05-18

## 2022-05-18 VITALS
HEIGHT: 61 IN | DIASTOLIC BLOOD PRESSURE: 68 MMHG | WEIGHT: 155 LBS | HEART RATE: 72 BPM | SYSTOLIC BLOOD PRESSURE: 132 MMHG

## 2022-05-18 DIAGNOSIS — K30 FUNCTIONAL DYSPEPSIA: ICD-10-CM

## 2022-05-18 PROCEDURE — 99212 OFFICE O/P EST SF 10 MIN: CPT | Performed by: NURSE PRACTITIONER

## 2022-05-18 RX ORDER — OMEPRAZOLE 20 MG/1
20 CAPSULE, DELAYED RELEASE ORAL
Qty: 30 | Refills: 11 | Status: ACTIVE
Start: 2022-05-18

## 2022-05-31 ENCOUNTER — HOSPITAL ENCOUNTER (EMERGENCY)
Facility: HOSPITAL | Age: 77
Discharge: HOME | End: 2022-05-31
Attending: EMERGENCY MEDICINE | Admitting: EMERGENCY MEDICINE

## 2022-05-31 VITALS
WEIGHT: 153 LBS | BODY MASS INDEX: 28.89 KG/M2 | OXYGEN SATURATION: 98 % | TEMPERATURE: 97.9 F | HEART RATE: 85 BPM | HEIGHT: 61 IN | RESPIRATION RATE: 17 BRPM | SYSTOLIC BLOOD PRESSURE: 138 MMHG | DIASTOLIC BLOOD PRESSURE: 74 MMHG

## 2022-05-31 DIAGNOSIS — H92.01 EARACHE ON RIGHT: Primary | ICD-10-CM

## 2022-05-31 DIAGNOSIS — H65.191 OTHER NON-RECURRENT ACUTE NONSUPPURATIVE OTITIS MEDIA OF RIGHT EAR: ICD-10-CM

## 2022-05-31 PROCEDURE — 99283 EMERGENCY DEPT VISIT LOW MDM: CPT

## 2022-05-31 RX ORDER — CEFDINIR 300 MG/1
300 CAPSULE ORAL ONCE
Status: COMPLETED | OUTPATIENT
Start: 2022-05-31 | End: 2022-05-31

## 2022-05-31 RX ORDER — CEFDINIR 300 MG/1
300 CAPSULE ORAL 2 TIMES DAILY
Qty: 10 CAPSULE | Refills: 0 | Status: SHIPPED | OUTPATIENT
Start: 2022-05-31 | End: 2022-06-05

## 2022-05-31 RX ORDER — ACETAMINOPHEN 500 MG
1000 TABLET ORAL ONCE
Status: COMPLETED | OUTPATIENT
Start: 2022-05-31 | End: 2022-05-31

## 2022-05-31 RX ADMIN — ACETAMINOPHEN 1000 MG: 500 TABLET ORAL at 20:43

## 2022-05-31 RX ADMIN — CEFDINIR 300 MG: 300 CAPSULE ORAL at 20:59

## 2022-06-01 NOTE — ED PROVIDER NOTES
Subjective       Patient is 77-year-old female comes in complaining of right-sided ear pain since yesterday.  Patient states that her pain has become worse over the last day.  Patient states her pain is about a 5 out of 10 in her right ear that is constant nothing seems to make it better or worse.  Patient also reports a dry scratchy throat.  Patient denies any trouble swallowing, fever, cough, chest pain, shortness of breath or any other acute symptoms at this time.  Patient does report history of ear infections in the past.          Review of Systems   Constitutional: Negative for chills, fatigue and fever.   HENT: Positive for ear pain, postnasal drip, rhinorrhea, sinus pressure and sore throat. Negative for congestion, dental problem, drooling, ear discharge, facial swelling, hearing loss, mouth sores, nosebleeds, sinus pain, sneezing, tinnitus and trouble swallowing.    Eyes: Negative for photophobia, discharge and visual disturbance.   Respiratory: Negative for cough and shortness of breath.    Cardiovascular: Negative for chest pain and leg swelling.   Gastrointestinal: Negative for abdominal pain, diarrhea, nausea and vomiting.   Genitourinary: Negative for dysuria, flank pain and urgency.   Musculoskeletal: Negative for arthralgias and myalgias.   Skin: Negative for rash.   Neurological: Negative for dizziness and headaches.   Psychiatric/Behavioral: Negative for confusion.       Past Medical History:   Diagnosis Date   • Breast cancer (HCC)     BOTH 15 YEARS   • TIA (transient ischemic attack) u       Allergies   Allergen Reactions   • Ibuprofen Hives   • Penicillins Hives   • Statins Unknown - High Severity   • Latex Rash       Past Surgical History:   Procedure Laterality Date   • BREAST BIOPSY     • BREAST LUMPECTOMY  15 YEARS AGO    BOTH BREASTS       History reviewed. No pertinent family history.    Social History     Socioeconomic History   • Marital status:    Tobacco Use   • Smoking status:  Former Smoker   • Smokeless tobacco: Never Used   Substance and Sexual Activity   • Alcohol use: Not Currently   • Drug use: Not Currently           Objective   Physical Exam  Vitals and nursing note reviewed.   Constitutional:       General: She is not in acute distress.     Appearance: She is well-developed. She is not diaphoretic.   HENT:      Head: Normocephalic and atraumatic.      Right Ear: External ear normal. There is impacted cerumen.      Left Ear: External ear normal. There is no impacted cerumen.      Ears:      Comments: Patient appears to have impacted cerumen right canal.  Patient has erythema around left TM.  After canal is clean right TM appears to have erythema surrounding it.     Nose: Nose normal. No congestion or rhinorrhea.      Mouth/Throat:      Mouth: Mucous membranes are moist.      Pharynx: No oropharyngeal exudate or posterior oropharyngeal erythema.   Eyes:      Extraocular Movements: Extraocular movements intact.      Conjunctiva/sclera: Conjunctivae normal.      Pupils: Pupils are equal, round, and reactive to light.   Cardiovascular:      Rate and Rhythm: Normal rate and regular rhythm.      Pulses: Normal pulses.      Heart sounds: Normal heart sounds.      Comments: S1, S2 audible.  Pulmonary:      Effort: Pulmonary effort is normal. No respiratory distress.      Breath sounds: Normal breath sounds. No wheezing.      Comments: On room air.  Abdominal:      General: Bowel sounds are normal. There is no distension.      Palpations: Abdomen is soft.      Tenderness: There is no abdominal tenderness. There is no guarding or rebound.   Musculoskeletal:         General: No tenderness or deformity. Normal range of motion.      Cervical back: Normal range of motion.      Right lower leg: No edema.      Left lower leg: No edema.   Skin:     General: Skin is warm.      Capillary Refill: Capillary refill takes less than 2 seconds.      Findings: No erythema or rash.   Neurological:       "General: No focal deficit present.      Mental Status: She is alert and oriented to person, place, and time.      Cranial Nerves: No cranial nerve deficit.   Psychiatric:         Mood and Affect: Mood normal.         Behavior: Behavior normal.         Procedures           ED Course      /74   Pulse 85   Temp 97.9 °F (36.6 °C)   Resp 17   Ht 154.9 cm (61\")   Wt 69.4 kg (153 lb)   SpO2 98%   BMI 28.91 kg/m²   Labs Reviewed - No data to display  No radiology results for the last day                                             MDM     Chart review: Allergies reviewed  EKG: Not indicated   Imaging: Not indicated  Labs: Patient refused COVID-19 swab.  Vitals:  /74   Pulse 85   Temp 97.9 °F (36.6 °C)   Resp 17   Ht 154.9 cm (61\")   Wt 69.4 kg (153 lb)   SpO2 98%   BMI 28.91 kg/m²     Medications given:    Medications   acetaminophen (TYLENOL) tablet 1,000 mg (1,000 mg Oral Given 5/31/22 2043)   cefdinir (OMNICEF) capsule 300 mg (300 mg Oral Given 5/31/22 2059)       Procedures:    MDM: Patient is a 77-year-old female comes in complaining of right-sided earache.  Patient had signs and symptoms with cerumen impaction of the left side.  This was irrigated out and patient reports significant relief of pain as well as improvement of hearing.  Patient refused COVID-19 swab.  On reassessment, patient had erythematous TMs bilaterally.  Patient given Omnicef given penicillin allergy and sent home on a 5-day course of this for otitis media.  Patient was given Tylenol for pain control as well.  Patient was given strict return precautions and voiced understanding. See full discharge instructions for further details.  Results and plan discussed with patient and is agreeable with plan.    Final diagnoses:   Earache on right   Other non-recurrent acute nonsuppurative otitis media of right ear       ED Disposition  ED Disposition     ED Disposition   Discharge    Condition   Stable    Comment   --       "       [facility] EMERGENCY DEPARTMENT  [address] [facility] [address] [phone]  Go in 1 day  As needed, If symptoms worsen    Jet Santana MD  [address] [place] [address] [phone]    Call in 1 week  As needed         Medication List      New Prescriptions    cefdinir 300 MG capsule  Commonly known as: OMNICEF  Take 1 capsule by mouth 2 (Two) Times a Day for 5 days.     phenol 1.4 % liquid liquid  Commonly known as: CHLORASEPTIC  Apply 1 spray to the mouth or throat Every 2 (Two) Hours As Needed (sore throat) for up to 3 days.           Where to Get Your Medications      These medications were sent to [facility] [address] [place] [address], [place] - [address] AT [facility] [address] - [phone]  - [phone] FX  [address] [place] [address]    Phone: [phone]   · cefdinir 300 MG capsule  · phenol 1.4 % liquid liquid          Luis Antonio Chandra PA  06/01/22 0100

## 2022-06-01 NOTE — DISCHARGE INSTRUCTIONS
Please take antibiotic to completion even if feeling better.  Can use Tylenol as needed for pain control.  Can take over-the-counter Claritin as needed for further allergy congestion relief.  Please come back to the ER if you spike high fevers you will need reevaluation time.  Can follow-up with your primary care provider or ENT in 1 week's time for symptom resolution.

## 2022-07-22 ENCOUNTER — HOSPITAL ENCOUNTER (EMERGENCY)
Facility: HOSPITAL | Age: 77
Discharge: HOME | End: 2022-07-22
Attending: EMERGENCY MEDICINE | Admitting: EMERGENCY MEDICINE

## 2022-07-22 VITALS
TEMPERATURE: 98.1 F | HEIGHT: 61 IN | RESPIRATION RATE: 20 BRPM | WEIGHT: 156.09 LBS | DIASTOLIC BLOOD PRESSURE: 78 MMHG | HEART RATE: 77 BPM | SYSTOLIC BLOOD PRESSURE: 145 MMHG | BODY MASS INDEX: 29.47 KG/M2 | OXYGEN SATURATION: 98 %

## 2022-07-22 DIAGNOSIS — R68.84 JAW PAIN: Primary | ICD-10-CM

## 2022-07-22 PROCEDURE — 99282 EMERGENCY DEPT VISIT SF MDM: CPT

## 2022-07-22 RX ORDER — CEPHALEXIN 500 MG/1
500 CAPSULE ORAL 3 TIMES DAILY
Qty: 21 CAPSULE | Refills: 0 | Status: SHIPPED | OUTPATIENT
Start: 2022-07-22 | End: 2023-04-04

## 2022-07-22 NOTE — ED PROVIDER NOTES
Subjective   Patient is a 77-year-old female who has had some mild pain to her left upper dentition with small amount of bleeding from her gingiva.  This developed over the past 2 days.  She denies fever earache sore throat or other complaint          Review of Systems  Negative for headache earache throat fever or other complaint  Past Medical History:   Diagnosis Date   • Breast cancer (HCC)     BOTH 15 YEARS   • TIA (transient ischemic attack) u       Allergies   Allergen Reactions   • Ibuprofen Hives   • Penicillins Hives   • Statins Unknown - High Severity   • Latex Rash       Past Surgical History:   Procedure Laterality Date   • BREAST BIOPSY     • BREAST LUMPECTOMY  15 YEARS AGO    BOTH BREASTS       No family history on file.    Social History     Socioeconomic History   • Marital status:    Tobacco Use   • Smoking status: Former Smoker   • Smokeless tobacco: Never Used   Substance and Sexual Activity   • Alcohol use: Not Currently   • Drug use: Not Currently           Objective   Physical Exam  HEENT exam shows TMs to be clear.  Oropharynx shows the patient had mild pain to percussion of her left upper dentition.  There is no gingival swelling Printz or drainage.  Neck adenopathy or stridor.  Lungs are clear.  Procedures           ED Course                                           MDM  Number of Diagnoses or Management Options  Jaw pain  Diagnosis management comments: Patient will be discharged with a diagnosis of jaw pain.  She does have a small mount of bleeding from her gingiva intermittently she states.  She is on Plavix.  She will follow with her dentist for outpatient evaluation.    Risk of Complications, Morbidity, and/or Mortality  Presenting problems: moderate  Diagnostic procedures: moderate  Management options: moderate    Patient Progress  Patient progress: stable      Final diagnoses:   Jaw pain       ED Disposition  ED Disposition     ED Disposition   Discharge    Condition   Stable     Comment   --             No follow-up provider specified.       Medication List      New Prescriptions    cephalexin 500 MG capsule  Commonly known as: KEFLEX  Take 1 capsule by mouth 3 (Three) Times a Day.           Where to Get Your Medications      These medications were sent to [facility] [address] [place], [place] [address], [place] - [address] AT [facility] [address] - [phone] PH - [phone] FX  [address] [place] [address]    Phone: [phone]   · cephalexin 500 MG capsule          Michael Arias MD  07/22/22 2129

## 2022-09-23 ENCOUNTER — TRANSCRIBE ORDERS (OUTPATIENT)
Dept: ADMINISTRATIVE | Facility: HOSPITAL | Age: 77
End: 2022-09-23

## 2022-09-23 DIAGNOSIS — M48.062 SPINAL STENOSIS, LUMBAR REGION WITH NEUROGENIC CLAUDICATION: Primary | ICD-10-CM

## 2022-09-23 DIAGNOSIS — Z12.31 VISIT FOR SCREENING MAMMOGRAM: ICD-10-CM

## 2022-10-19 ENCOUNTER — HOSPITAL ENCOUNTER (OUTPATIENT)
Dept: MRI IMAGING | Facility: HOSPITAL | Age: 77
Discharge: HOME | End: 2022-10-19

## 2022-10-19 ENCOUNTER — HOSPITAL ENCOUNTER (OUTPATIENT)
Dept: MAMMOGRAPHY | Facility: HOSPITAL | Age: 77
Discharge: HOME | End: 2022-10-19

## 2022-10-19 DIAGNOSIS — Z12.31 VISIT FOR SCREENING MAMMOGRAM: ICD-10-CM

## 2022-10-19 DIAGNOSIS — M48.062 SPINAL STENOSIS, LUMBAR REGION WITH NEUROGENIC CLAUDICATION: ICD-10-CM

## 2022-10-19 PROCEDURE — 77063 BREAST TOMOSYNTHESIS BI: CPT

## 2022-10-19 PROCEDURE — 77067 SCR MAMMO BI INCL CAD: CPT

## 2022-10-19 PROCEDURE — 72148 MRI LUMBAR SPINE W/O DYE: CPT

## 2023-04-03 NOTE — PROGRESS NOTES
Subjective: Leg paresthesia    Patient ID: Lidia Rick is a 77 y.o. female.    CHIEF COMPLAINT: Leg paresthesia    History of Present Illness Ms. Rick is a very pleasant 77-year-old [place] American who presented today as a new patient for an evaluation of bilateral leg numbness referred by Dr. Valdez.  The patient states in November 2022 she had an episode where her legs completely gave out, she fell to the ground, lost control of her bowels and bladder.  She states she could not stand for a few minutes then slowly got up.  She reports she has had no other episodes like this.  She reports currently she has numbness in both thighs that will come down from both buttocks to the outer episodes of both thighs to the knee bilateral.  She states this occurs if she stands extra straight.        Complaint: Paresthesia in both legs  Onset: Nov. 2022 was the occurrence when she had the bad fall noted above  Location: BLE  Quality: Numbness  Severity: moderate  Duration: Hours  Frequency: Frequency is daily   timing: Varies  Worsening factors: standing straight up causes it to happen  Alleviating factors: bending at her knees  Associated Signs and symptoms:   Numbness and pain bilateral thighs, outer aspect and bilateralbuttock area,  Current meds: No medications        Testing:    Examination: MRI LUMBAR SPINE WO CONTRAST-   Date of Exam: 10/19/2022 1:15 PM   Indication: M48.062; M48.062-Spinal stenosis, lumbar region with  neurogenic claudication.  9-  IMPRESSION:  1. Negative for acute osseous abnormality.  2. Transitional lumbosacral anatomy. Recommend confirmation of vertebral  body numbering if operative intervention planned in the future.  3. Mild anterolisthesis of L3 on L4 measuring 2 mm and L4 on L5  measuring 5 mm as above.  4. Multilevel degenerative disc disease and facet arthritis contributing  to varying degrees of mild-to-moderate foraminal stenosis. No high-grade  central canal stenosis.    Electronically Signed By-Griffin Voss MD On:10/19/2022 3:00 PM        CT HEAD WO CONTRAST-   Date of Exam: 3/11/2022 6:50 PM   Indication: Dizziness, non-specific.   Comparison: 03/13/2019  IMPRESSION:  1. Stable exam, with no acute abnormality identified.  2. There is mild cerebral and cerebellar atrophy and minor chronic white  matter disease..   Electronically Signed By-Madison Cohen MD On:3/11/2022 7:53 PM    9/9/2019  IMPRESSION:   1. Stable exam, negative for acute ischemia.  2. Chronic signal abnormality mainly in the white matter is unchanged  and most likely due to chronic small vessel disease.   Electronically Signed By-Madison Cohen On:9/9/2019 5:24 PM  This report was finalized on [id number] by  Madison Cohen, .            The following portions of the patient's history were reviewed and updated as appropriate: allergies, current medications, past family history, past medical history, past social history, past surgical history and problem list.      History reviewed. No pertinent family history.    Past Medical History:   Diagnosis Date   • Breast cancer     BOTH 15 YEARS   • TIA (transient ischemic attack) u       Social History     Socioeconomic History   • Marital status:    Tobacco Use   • Smoking status: Former   • Smokeless tobacco: Never   Substance and Sexual Activity   • Alcohol use: Not Currently   • Drug use: Not Currently         Current Outpatient Medications:   •  amLODIPine (NORVASC) 10 MG tablet, , Disp: , Rfl:   •  aspirin 81 MG chewable tablet, aspirin 81 mg chewable tablet  CHEW ONE TABLET BY MOUTH DAILY, Disp: , Rfl:   •  clopidogrel (PLAVIX) 75 MG tablet, , Disp: , Rfl:   •  fluticasone (FLONASE) 50 MCG/ACT nasal spray, fluticasone propionate 50 mcg/actuation nasal spray,suspension, Disp: , Rfl:   •  isosorbide mononitrate (ISMO,MONOKET) 20 MG tablet, , Disp: , Rfl:   •  lisinopril (PRINIVIL,ZESTRIL) 5 MG tablet, , Disp: , Rfl:   •  metFORMIN (GLUCOPHAGE) 500 MG tablet,  metformin 500 mg tablet  1/2 tab by mouth twice daily, Disp: , Rfl:   •  metoprolol tartrate (LOPRESSOR) 25 MG tablet, , Disp: , Rfl:   •  omeprazole (priLOSEC) 40 MG capsule, , Disp: , Rfl:   •  ranolazine (RANEXA) 500 MG 12 hr tablet, , Disp: , Rfl:   •  rosuvastatin (CRESTOR) 20 MG tablet, Take 1 tablet by mouth Daily., Disp: , Rfl:   •  vitamin D (ERGOCALCIFEROL) 1.25 MG (08150 UT) capsule capsule, , Disp: , Rfl:     Review of Systems   HENT: Positive for dental problem, drooling, hearing loss, mouth sores, postnasal drip, sinus pressure, sinus pain and trouble swallowing.    Eyes: Positive for itching.   Respiratory: Positive for cough and shortness of breath.    Cardiovascular: Positive for leg swelling.   Gastrointestinal: Positive for abdominal pain.   Endocrine: Positive for cold intolerance.   Genitourinary: Positive for frequency.   Musculoskeletal: Positive for back pain and neck pain.   Allergic/Immunologic: Positive for food allergies.   Neurological: Positive for headaches.   Hematological: Bruises/bleeds easily.   All other systems reviewed and are negative.       I have reviewed ROS completed by medical assistant.     Objective:    Neurologic Exam     Mental Status   Oriented to person, place, and time.     Cranial Nerves   Cranial nerves II through XII intact.     CN III, IV, VI   Pupils are equal, round, and reactive to light.    Gait, Coordination, and Reflexes     Gait  Gait: normal    Coordination   Finger to nose coordination: normal  Tandem walking coordination: normal    Reflexes   Right brachioradialis: 1+  Left brachioradialis: 1+  Right biceps: 1+  Left biceps: 1+  Right triceps: 1+  Left triceps: 1+  Right patellar: 0  Left patellar: 0  Right achilles: 0  Left achilles: 0      Physical Exam  Vitals reviewed.   Constitutional:       Appearance: Normal appearance. She is overweight.       HENT:      Head: Normocephalic and atraumatic.      Right Ear: Hearing normal.      Left Ear: Hearing  normal.      Nose: Nose normal.      Mouth/Throat:      Lips: Pink.      Mouth: Mucous membranes are moist.   Eyes:      General: Lids are normal. No visual field deficit.     Extraocular Movements: Extraocular movements intact.      Right eye: Normal extraocular motion and no nystagmus.      Left eye: Normal extraocular motion and no nystagmus.      Pupils: Pupils are equal, round, and reactive to light.   Cardiovascular:      Rate and Rhythm: Normal rate and regular rhythm.      Pulses: Normal pulses.      Heart sounds: Normal heart sounds, S1 normal and S2 normal.   Pulmonary:      Effort: Pulmonary effort is normal.      Breath sounds: Normal breath sounds.   Musculoskeletal:         General: Normal range of motion.      Cervical back: Full passive range of motion without pain and normal range of motion.      Comments: 5/5 all including , dorsiflexion and plantar flexion of feet, no clonus    Skin:     General: Skin is warm and dry.   Neurological:      Mental Status: She is alert and oriented to person, place, and time.      Cranial Nerves: Cranial nerves 2-12 are intact. No cranial nerve deficit, dysarthria or facial asymmetry.      Sensory: Sensation is intact. No sensory deficit.      Motor: Motor function is intact. No weakness, tremor, atrophy, abnormal muscle tone, seizure activity or pronator drift.      Coordination: Romberg sign negative. Coordination normal. Heel to Shin Test abnormal (could not complete heel to shin bilateral ). Finger-Nose-Finger Test normal. Rapid alternating movements normal.      Gait: Gait is intact. Gait and tandem walk normal.      Deep Tendon Reflexes: Reflexes abnormal. Babinski sign absent on the right side. Babinski sign absent on the left side.      Reflex Scores:       Tricep reflexes are 1+ on the right side and 1+ on the left side.       Bicep reflexes are 1+ on the right side and 1+ on the left side.       Brachioradialis reflexes are 1+ on the right side and 1+  on the left side.       Patellar reflexes are 0 on the right side and 0 on the left side.       Achilles reflexes are 0 on the right side and 0 on the left side.     Comments: Reports that her legs go numb in thighs and buttocks when standing straight   Psychiatric:         Attention and Perception: Attention normal.         Mood and Affect: Mood normal.         Behavior: Behavior is cooperative.         Assessment/Plan: Discussion: I offered the patient a prescription for gabapentin however she does not want the medication.  She reports that the numbness and burning is uncomfortable but is not so severe that she cannot stand it.  She denies any other bowel or bladder loss.    Diagnoses and all orders for this visit:    1. Paresthesia of bilateral legs (Primary)  -     EMG & Nerve Conduction Test; Future     The patient was notified about the EMG/NCV Test procedure and all questions were answered. She was notified if she had another spell where her legs dropped out from under her to go to the ED immediately.     Return in about 6 months (around 10/4/2023) for oJselin Charles.    I spent 52 minutes caring for Lidia on this date of service. This time includes time spent by me in the following activities: reviewing tests, obtaining and/or reviewing a separately obtained history, performing a medically appropriate examination and/or evaluation, counseling and educating the patient/family/caregiver, ordering medications, tests, or procedures and documenting information in the medical record.      This document has been electronically signed by Joselin SANDERS on April 4, 2023 14:59 EDT

## 2023-04-04 ENCOUNTER — OFFICE VISIT (OUTPATIENT)
Dept: NEUROLOGY | Facility: CLINIC | Age: 78
End: 2023-04-04
Payer: COMMERCIAL

## 2023-04-04 VITALS
BODY MASS INDEX: 29.64 KG/M2 | DIASTOLIC BLOOD PRESSURE: 68 MMHG | HEIGHT: 61 IN | SYSTOLIC BLOOD PRESSURE: 118 MMHG | HEART RATE: 74 BPM | WEIGHT: 157 LBS

## 2023-04-04 DIAGNOSIS — R20.2 PARESTHESIA OF BILATERAL LEGS: Primary | ICD-10-CM

## 2023-04-04 PROCEDURE — 99204 OFFICE O/P NEW MOD 45 MIN: CPT | Performed by: NURSE PRACTITIONER

## 2023-04-04 RX ORDER — OMEPRAZOLE 40 MG/1
CAPSULE, DELAYED RELEASE ORAL
COMMUNITY
Start: 2023-02-25

## 2023-04-04 RX ORDER — ISOSORBIDE MONONITRATE 20 MG/1
TABLET ORAL
COMMUNITY
Start: 2023-04-03

## 2023-04-04 RX ORDER — FLUTICASONE PROPIONATE 50 MCG
SPRAY, SUSPENSION (ML) NASAL
COMMUNITY

## 2023-04-04 RX ORDER — ROSUVASTATIN CALCIUM 20 MG/1
20 TABLET, COATED ORAL DAILY
COMMUNITY

## 2023-04-04 RX ORDER — RANOLAZINE 500 MG/1
TABLET, EXTENDED RELEASE ORAL
COMMUNITY
Start: 2023-03-13

## 2023-04-04 RX ORDER — LISINOPRIL 5 MG/1
TABLET ORAL
COMMUNITY
Start: 2023-01-20

## 2023-05-31 NOTE — PROGRESS NOTES
EMG and Nerve Conduction Studies    Patient Name: Lidia Rick    Date of Study:6/2/2023    Referring Provider:Joselin COWAN    History:    BLE Paresthesia of bilateral legs    This patient is a 78-year-old female with history of paresthesia in the feet with history of diabetes.    Results:    The complete report includes the data sheets.     Prior to starting the procedure, the patient's identity was verified, pertinent available records were reviewed, the nature of the procedure was explained, the appropriate site of the exam were confirmed directly with the patient, and a pre-procedure pause was performed for final verification of all the above.    1.  The right sural sensory conduction study was normal the left medial plantar study showed normal distal latency but reduced amplitude nerve action potentials.    2.  The right tibial motor distal latency and conduction velocity from knee to ankle was normal with normal amplitude compound muscle action potential.    3.  EMG of the muscles examined in the right lower extremity and the L3-S1 myotomes was normal except for minor neurogenic changes in the extensor digitorum brevis muscle.    Impression:    This is an abnormal study.  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  Clinical correlation regarding the significance of these findings is indicated as these findings may be related to age.      Electronically signed by :    Joseph Seipel, M.D.  June 9, 2023

## 2023-06-02 ENCOUNTER — PROCEDURE VISIT (OUTPATIENT)
Dept: NEUROLOGY | Facility: CLINIC | Age: 78
End: 2023-06-02
Payer: COMMERCIAL

## 2023-06-02 VITALS — SYSTOLIC BLOOD PRESSURE: 104 MMHG | DIASTOLIC BLOOD PRESSURE: 64 MMHG | HEART RATE: 81 BPM

## 2023-06-02 DIAGNOSIS — R20.2 PARESTHESIA OF BOTH FEET: Primary | ICD-10-CM

## 2023-06-02 PROBLEM — Z78.0 POSTMENOPAUSAL STATE: Status: ACTIVE | Noted: 2021-09-28

## 2023-06-02 PROBLEM — M19.90 DEGENERATIVE JOINT DISEASE: Status: ACTIVE | Noted: 2018-05-11

## 2023-06-02 PROBLEM — M51.36 DEGENERATION OF LUMBAR INTERVERTEBRAL DISC: Status: ACTIVE | Noted: 2018-05-11

## 2023-06-02 PROBLEM — U07.1 COVID-19: Status: ACTIVE | Noted: 2023-03-07

## 2023-06-02 PROBLEM — R32 URINARY INCONTINENCE: Status: ACTIVE | Noted: 2023-03-06

## 2023-06-02 PROBLEM — K21.9 GASTROESOPHAGEAL REFLUX DISEASE: Status: ACTIVE | Noted: 2021-09-28

## 2023-06-02 PROBLEM — J32.0 CHRONIC MAXILLARY SINUSITIS: Status: ACTIVE | Noted: 2023-03-06

## 2023-06-02 PROBLEM — L72.0 EPIDERMOID CYST OF SKIN: Status: ACTIVE | Noted: 2023-06-02

## 2023-06-02 PROBLEM — J44.1 CHRONIC ASTHMATIC BRONCHITIS WITH ACUTE EXACERBATION: Status: ACTIVE | Noted: 2023-03-06

## 2023-06-02 PROBLEM — M51.369 DEGENERATION OF LUMBAR INTERVERTEBRAL DISC: Status: ACTIVE | Noted: 2018-05-11

## 2023-06-02 PROBLEM — L50.1 URTICARIA, IDIOPATHIC: Status: ACTIVE | Noted: 2018-05-11

## 2023-06-02 PROBLEM — J01.00 ACUTE MAXILLARY SINUSITIS: Status: ACTIVE | Noted: 2018-02-22

## 2023-06-02 PROBLEM — L03.119 CELLULITIS OF HAND: Status: ACTIVE | Noted: 2019-03-26

## 2023-06-02 PROBLEM — M47.812 CERVICAL SPONDYLOSIS WITHOUT MYELOPATHY: Status: ACTIVE | Noted: 2019-01-29

## 2023-06-02 PROBLEM — R41.3 MILD MEMORY DISTURBANCE: Status: ACTIVE | Noted: 2018-05-23

## 2023-06-02 PROBLEM — E87.6 HYPOKALEMIA: Status: ACTIVE | Noted: 2018-05-22

## 2023-06-02 PROBLEM — H69.80 EUSTACHIAN TUBE DYSFUNCTION: Status: ACTIVE | Noted: 2023-06-02

## 2023-06-02 PROBLEM — R05.9 COUGH: Status: ACTIVE | Noted: 2023-03-07

## 2023-06-02 PROBLEM — M15.9 OSTEOARTHRITIS, GENERALIZED: Status: ACTIVE | Noted: 2018-05-11

## 2023-06-02 PROBLEM — J30.1 HAY FEVER: Status: ACTIVE | Noted: 2018-05-11

## 2023-06-02 PROBLEM — IMO0002 UNCONTROLLED TYPE 2 DIABETES MELLITUS: Status: ACTIVE | Noted: 2020-01-13

## 2023-06-02 PROBLEM — R06.02 SHORTNESS OF BREATH: Status: ACTIVE | Noted: 2018-05-18

## 2023-06-02 PROBLEM — M10.9 GOUTY ARTHROPATHY: Status: ACTIVE | Noted: 2018-02-03

## 2023-06-02 PROBLEM — M54.40 ACUTE BACK PAIN WITH SCIATICA: Status: ACTIVE | Noted: 2023-06-02

## 2023-06-02 PROBLEM — S62.235A OTHER NONDISPLACED FRACTURE OF BASE OF FIRST METACARPAL BONE, LEFT HAND, INITIAL ENCOUNTER FOR CLOSED FRACTURE: Status: ACTIVE | Noted: 2019-03-26

## 2023-06-02 PROBLEM — N81.11 MIDLINE CYSTOCELE: Status: ACTIVE | Noted: 2018-02-22

## 2023-06-02 PROBLEM — I25.10 CORONARY ARTERIOSCLEROSIS: Status: ACTIVE | Noted: 2021-09-28

## 2023-06-02 PROBLEM — M15.9 GENERALIZED OSTEOARTHRITIS: Status: ACTIVE | Noted: 2018-02-22

## 2023-06-02 PROBLEM — Q21.12 PATENT FORAMEN OVALE: Status: ACTIVE | Noted: 2018-06-09

## 2023-06-02 PROBLEM — H69.90 EUSTACHIAN TUBE DYSFUNCTION: Status: ACTIVE | Noted: 2023-06-02

## 2023-06-02 PROBLEM — M54.81 OCCIPITAL NEURALGIA: Status: ACTIVE | Noted: 2019-01-29

## 2023-06-02 PROBLEM — R30.0 DIFFICULT OR PAINFUL URINATION: Status: ACTIVE | Noted: 2023-03-06

## 2023-06-02 PROBLEM — M85.80 OSTEOPENIA: Status: ACTIVE | Noted: 2019-07-28

## 2023-06-02 PROBLEM — A08.4 VIRAL GASTROENTERITIS: Status: ACTIVE | Noted: 2023-06-02

## 2023-06-02 PROBLEM — J30.1 ALLERGIC RHINITIS DUE TO POLLEN: Status: ACTIVE | Noted: 2023-03-06

## 2023-06-02 PROBLEM — M81.0 OSTEOPOROSIS: Status: ACTIVE | Noted: 2018-05-11

## 2023-06-02 PROBLEM — M54.2 NECK PAIN: Status: ACTIVE | Noted: 2019-01-29

## 2023-06-02 PROBLEM — M54.9 BACKACHE: Status: ACTIVE | Noted: 2023-06-02

## 2023-06-02 PROBLEM — B37.9 CANDIDIASIS: Status: ACTIVE | Noted: 2023-06-02

## 2023-06-02 PROBLEM — I67.9 CEREBROVASCULAR DISEASE: Status: ACTIVE | Noted: 2018-05-23

## 2023-06-02 PROBLEM — N81.10 CYSTOCELE WITHOUT UTERINE PROLAPSE: Status: ACTIVE | Noted: 2018-05-11

## 2023-06-02 PROBLEM — M15.9 OSTEOARTHRITIS OF MULTIPLE JOINTS: Status: ACTIVE | Noted: 2023-06-02

## 2023-06-02 PROBLEM — E11.9 TYPE 2 DIABETES MELLITUS WITHOUT COMPLICATION: Status: ACTIVE | Noted: 2021-07-20

## 2023-06-02 PROBLEM — E66.3 OVERWEIGHT WITH BODY MASS INDEX (BMI) 25.0-29.9: Status: ACTIVE | Noted: 2018-02-22

## 2023-06-02 PROBLEM — R07.0 PAIN IN THROAT: Status: ACTIVE | Noted: 2023-03-07

## 2023-06-02 PROBLEM — E55.9 VITAMIN D DEFICIENCY: Status: ACTIVE | Noted: 2018-05-11

## 2023-06-02 PROBLEM — R79.89 ABNORMAL LIVER FUNCTION TESTS: Status: ACTIVE | Noted: 2017-05-17

## 2023-06-02 PROBLEM — D49.3: Status: ACTIVE | Noted: 2018-05-11

## 2023-06-02 RX ORDER — CEFDINIR 300 MG/1
CAPSULE ORAL
COMMUNITY
Start: 2023-03-07

## 2023-06-02 RX ORDER — CHLORHEXIDINE GLUCONATE 0.12 MG/ML
RINSE ORAL
COMMUNITY

## 2023-06-02 RX ORDER — CYCLOBENZAPRINE HCL 10 MG
TABLET ORAL
COMMUNITY

## 2023-06-02 RX ORDER — PREDNISOLONE ACETATE 10 MG/ML
SUSPENSION/ DROPS OPHTHALMIC
COMMUNITY

## 2023-06-02 RX ORDER — ASPIRIN 81 MG/1
1 TABLET, CHEWABLE ORAL DAILY
COMMUNITY
Start: 2023-03-23

## 2023-06-02 RX ORDER — PNEUMOCOCCAL 13-VALENT CONJUGATE VACCINE 2.2; 2.2; 2.2; 2.2; 2.2; 4.4; 2.2; 2.2; 2.2; 2.2; 2.2; 2.2; 2.2 UG/.5ML; UG/.5ML; UG/.5ML; UG/.5ML; UG/.5ML; UG/.5ML; UG/.5ML; UG/.5ML; UG/.5ML; UG/.5ML; UG/.5ML; UG/.5ML; UG/.5ML
INJECTION, SUSPENSION INTRAMUSCULAR
COMMUNITY

## 2023-06-02 RX ORDER — PNEUMOCOCCAL VACCINE POLYVALENT 25; 25; 25; 25; 25; 25; 25; 25; 25; 25; 25; 25; 25; 25; 25; 25; 25; 25; 25; 25; 25; 25; 25 UG/.5ML; UG/.5ML; UG/.5ML; UG/.5ML; UG/.5ML; UG/.5ML; UG/.5ML; UG/.5ML; UG/.5ML; UG/.5ML; UG/.5ML; UG/.5ML; UG/.5ML; UG/.5ML; UG/.5ML; UG/.5ML; UG/.5ML; UG/.5ML; UG/.5ML; UG/.5ML; UG/.5ML; UG/.5ML; UG/.5ML
INJECTION, SOLUTION INTRAMUSCULAR; SUBCUTANEOUS
COMMUNITY

## 2023-06-02 RX ORDER — SULFACETAMIDE SODIUM 100 MG/ML
SOLUTION/ DROPS OPHTHALMIC
COMMUNITY

## 2023-06-02 RX ORDER — NYSTATIN AND TRIAMCINOLONE ACETONIDE 100000; 1 [USP'U]/G; MG/G
OINTMENT TOPICAL
COMMUNITY

## 2023-06-02 RX ORDER — TETANUS TOXOID, REDUCED DIPHTHERIA TOXOID AND ACELLULAR PERTUSSIS VACCINE, ADSORBED 5; 2.5; 8; 8; 2.5 [IU]/.5ML; [IU]/.5ML; UG/.5ML; UG/.5ML; UG/.5ML
SUSPENSION INTRAMUSCULAR
COMMUNITY

## 2023-06-02 RX ORDER — MINOCYCLINE HYDROCHLORIDE 50 MG/1
CAPSULE ORAL
COMMUNITY

## 2023-06-02 RX ORDER — LISINOPRIL 5 MG/1
1 TABLET ORAL DAILY
COMMUNITY

## 2023-06-02 RX ORDER — CLOPIDOGREL BISULFATE 75 MG/1
1 TABLET ORAL DAILY
COMMUNITY

## 2023-06-02 RX ORDER — ZOSTER VACCINE LIVE 19400 [PFU]/.65ML
INJECTION, POWDER, LYOPHILIZED, FOR SUSPENSION SUBCUTANEOUS
COMMUNITY

## 2023-06-02 RX ORDER — AMLODIPINE BESYLATE 10 MG/1
1 TABLET ORAL DAILY
COMMUNITY

## 2023-08-24 PROBLEM — H90.3 BILATERAL SENSORINEURAL HEARING LOSS: Status: ACTIVE | Noted: 2023-05-02

## 2023-08-24 NOTE — PROGRESS NOTES
Subjective: Leg paresthesia - mild length dependent axonal sensorimotor neuropathy    Patient ID: Lidia Rick is a 78 y.o. female.    History of Present Illness ms Rick is a very pleasant 78 year old [place] American Female who is following up on   recent EMG results.  She reports that her legs are still sore and that she would like a trial on medication.   She reports discomfort on bilateral thighs and calves. Described as tingling and burning.       EMG  Impression:   This is an abnormal study.  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  Clinical correlation regarding the significance of these findings is indicated as these findings may be related to age.    Electronically signed by :   Joseph Seipel, M.D.  June 9, 2023    The following portions of the patient's history were reviewed and updated as appropriate: allergies, current medications, past family history, past medical history, past social history, past surgical history and problem list.    History reviewed. No pertinent family history.    Past Medical History:   Diagnosis Date    Breast cancer     BOTH 15 YEARS    TIA (transient ischemic attack) u       Social History     Socioeconomic History    Marital status:    Tobacco Use    Smoking status: Former    Smokeless tobacco: Never   Substance and Sexual Activity    Alcohol use: Not Currently    Drug use: Not Currently         Current Outpatient Medications:     amLODIPine (NORVASC) 10 MG tablet, Take 1 tablet by mouth Daily., Disp: , Rfl:     aspirin 81 MG chewable tablet, aspirin 81 mg chewable tablet  CHEW ONE TABLET BY MOUTH DAILY, Disp: , Rfl:     cefdinir (OMNICEF) 300 MG capsule, Take 1 capsule every 12 hours by oral route., Disp: , Rfl:     chlorhexidine (PERIDEX) 0.12 % solution, , Disp: , Rfl:     clopidogrel (PLAVIX) 75 MG  tablet, Take 1 tablet by mouth Daily., Disp: , Rfl:     cyclobenzaprine (FLEXERIL) 10 MG tablet, , Disp: , Rfl:     fluticasone (FLONASE) 50 MCG/ACT nasal spray, fluticasone propionate 50 mcg/actuation nasal spray,suspension, Disp: , Rfl:     gabapentin (NEURONTIN) 300 MG capsule, Wk1: take 1 tab at bedtime, WK2: take 1 pill twice a day, Disp: 60 capsule, Rfl: 5    influenza virus vacc split PF 0.5 ML suspension prefilled syringe, inject 0.5 milliliter intramuscularly, Disp: , Rfl:     isosorbide mononitrate (ISMO,MONOKET) 20 MG tablet, , Disp: , Rfl:     lisinopril (PRINIVIL,ZESTRIL) 5 MG tablet, Take 1 tablet by mouth Daily., Disp: , Rfl:     metFORMIN (GLUCOPHAGE) 500 MG tablet, Take 1 tablet by mouth 2 (Two) Times a Day., Disp: , Rfl:     metoprolol tartrate (LOPRESSOR) 25 MG tablet, , Disp: , Rfl:     minocycline (MINOCIN,DYNACIN) 50 MG capsule, , Disp: , Rfl:     nystatin-triamcinolone (MYCOLOG) 489494-2.1 UNIT/GM-% ointment, Apply 1 application 3 times a day by topical route as directed., Disp: , Rfl:     omeprazole (priLOSEC) 40 MG capsule, , Disp: , Rfl:     pneumococcal conj. 13-valent (Prevnar 13) vaccine, , Disp: , Rfl:     pneumococcal polysaccharide 23-valent (Pneumovax 23) 25 MCG/0.5ML vaccine, inject 0.5 milliliter intramuscularly, Disp: , Rfl:     prednisoLONE acetate (PRED FORTE) 1 % ophthalmic suspension, , Disp: , Rfl:     ranolazine (RANEXA) 500 MG 12 hr tablet, , Disp: , Rfl:     rosuvastatin (CRESTOR) 20 MG tablet, Take 1 tablet by mouth Daily., Disp: , Rfl:     sulfacetamide (BLEPH-10) 10 % ophthalmic solution, , Disp: , Rfl:     vitamin D (ERGOCALCIFEROL) 1.25 MG (26310 UT) capsule capsule, , Disp: , Rfl:     Review of Systems       I have reviewed ROS completed by medical assistant.     Objective:    Neurologic Exam     Mental Status   Oriented to person, place, and time.     Physical Exam  Vitals reviewed.   Constitutional:       Appearance: Normal appearance. She is well-developed and  overweight.   HENT:      Head: Normocephalic and atraumatic.      Right Ear: Decreased hearing noted.      Left Ear: Decreased hearing noted.      Nose: Nose normal.      Mouth/Throat:      Lips: Pink.      Mouth: Mucous membranes are dry.   Eyes:      General: Lids are normal. No visual field deficit.  Cardiovascular:      Rate and Rhythm: Normal rate.   Pulmonary:      Effort: Pulmonary effort is normal.      Breath sounds: Normal breath sounds.   Musculoskeletal:         General: Normal range of motion.   Skin:     General: Skin is warm and dry.          Neurological:      General: No focal deficit present.      Mental Status: She is alert and oriented to person, place, and time.      Cranial Nerves: No dysarthria or facial asymmetry.      Motor: No weakness.      Gait: Gait normal.   Psychiatric:         Attention and Perception: Attention and perception normal.         Mood and Affect: Mood normal.     Assessment/Plan:  Discussion: The patient will be started on gabapentin 300 mg at bedtime for 1 week.  If her discomfort is controlled she can stay at that level.  Should her pain still be present during the day she can start the medication once in the morning and once at night.  She can hold wherever it controls the discomfort.  She is to call with any questions or concerns.  She was encouraged to also control her blood sugars as that can cause neuropathy to worsen.      Diagnoses and all orders for this visit:    1. Length Dependant Axonal Neuropathy (Primary)  -     gabapentin (NEURONTIN) 300 MG capsule; Wk1: take 1 tab at bedtime, WK2: take 1 pill twice a day  Dispense: 60 capsule; Refill: 5          Return in about 8 months (around 4/29/2024) for Joeslin Charles.     I spent 40 minutes caring for Lidia on this date of service. This time includes time spent by me in the following activities: reviewing tests, obtaining and/or reviewing a separately obtained history, performing a medically appropriate  examination and/or evaluation, counseling and educating the patient/family/caregiver, ordering medications, tests, or procedures, documenting information in the medical record, and independently interpreting results and communicating that information with the patient/family/caregiver.      This document has been electronically signed by MARILYN Guzman on August 29, 2023 14:09 EDT

## 2023-08-29 ENCOUNTER — OFFICE VISIT (OUTPATIENT)
Dept: NEUROLOGY | Facility: CLINIC | Age: 78
End: 2023-08-29
Payer: COMMERCIAL

## 2023-08-29 VITALS
HEIGHT: 61 IN | DIASTOLIC BLOOD PRESSURE: 63 MMHG | HEART RATE: 73 BPM | SYSTOLIC BLOOD PRESSURE: 118 MMHG | BODY MASS INDEX: 30.02 KG/M2 | WEIGHT: 159 LBS

## 2023-08-29 DIAGNOSIS — G62.9 NEUROPATHY: Primary | Chronic | ICD-10-CM

## 2023-08-29 RX ORDER — GABAPENTIN 300 MG/1
CAPSULE ORAL
Qty: 60 CAPSULE | Refills: 5 | Status: SHIPPED | OUTPATIENT
Start: 2023-08-29

## 2023-09-21 ENCOUNTER — TELEPHONE (OUTPATIENT)
Dept: NEUROLOGY | Facility: CLINIC | Age: 78
End: 2023-09-21

## 2023-09-21 NOTE — TELEPHONE ENCOUNTER
The [facility] received a fax that requires your attention. The document has been indexed to the patient’s chart for your review.      Reason for sending: SO PROVIDER IS AWARE OF REQUEST IN THE PATIENT'S CHART    Documents Description: CLINICAL QUESTION FOR PROVIDER 9/19/23    Name of Sender: [facility]    Date Indexed: 9/21/23

## 2023-10-13 ENCOUNTER — TRANSCRIBE ORDERS (OUTPATIENT)
Dept: ADMINISTRATIVE | Facility: HOSPITAL | Age: 78
End: 2023-10-13
Payer: COMMERCIAL

## 2023-10-13 DIAGNOSIS — Z12.31 VISIT FOR SCREENING MAMMOGRAM: Primary | ICD-10-CM

## 2023-10-26 ENCOUNTER — TRANSCRIBE ORDERS (OUTPATIENT)
Dept: ADMINISTRATIVE | Facility: HOSPITAL | Age: 78
End: 2023-10-26
Payer: COMMERCIAL

## 2023-10-26 DIAGNOSIS — Z12.31 SCREENING MAMMOGRAM, ENCOUNTER FOR: Primary | ICD-10-CM

## 2023-11-01 ENCOUNTER — HOSPITAL ENCOUNTER (OUTPATIENT)
Dept: MAMMOGRAPHY | Facility: HOSPITAL | Age: 78
Discharge: HOME | End: 2023-11-01
Admitting: FAMILY MEDICINE
Payer: COMMERCIAL

## 2023-11-01 DIAGNOSIS — Z12.31 SCREENING MAMMOGRAM, ENCOUNTER FOR: ICD-10-CM

## 2023-11-01 PROCEDURE — 77063 BREAST TOMOSYNTHESIS BI: CPT

## 2023-11-01 PROCEDURE — 77067 SCR MAMMO BI INCL CAD: CPT

## 2023-11-13 ENCOUNTER — HOSPITAL ENCOUNTER (OUTPATIENT)
Dept: MAMMOGRAPHY | Facility: HOSPITAL | Age: 78
Discharge: HOME | End: 2023-11-13
Admitting: FAMILY MEDICINE
Payer: COMMERCIAL

## 2023-11-13 ENCOUNTER — HOSPITAL ENCOUNTER (OUTPATIENT)
Dept: ULTRASOUND IMAGING | Facility: HOSPITAL | Age: 78
Discharge: HOME | End: 2023-11-13
Payer: COMMERCIAL

## 2023-11-13 DIAGNOSIS — N63.0 LUMP IN FEMALE BREAST: ICD-10-CM

## 2023-11-13 PROCEDURE — 77065 DX MAMMO INCL CAD UNI: CPT

## 2023-11-13 PROCEDURE — G0279 TOMOSYNTHESIS, MAMMO: HCPCS

## 2023-11-13 PROCEDURE — 76642 ULTRASOUND BREAST LIMITED: CPT

## 2024-03-13 DIAGNOSIS — G62.9 NEUROPATHY: Chronic | ICD-10-CM

## 2024-03-13 RX ORDER — GABAPENTIN 300 MG/1
CAPSULE ORAL
Qty: 60 CAPSULE | Refills: 5 | Status: SHIPPED | OUTPATIENT
Start: 2024-03-13

## 2024-10-09 ENCOUNTER — TRANSCRIBE ORDERS (OUTPATIENT)
Dept: ADMINISTRATIVE | Facility: HOSPITAL | Age: 79
End: 2024-10-09
Payer: COMMERCIAL

## 2024-10-09 DIAGNOSIS — Z12.31 VISIT FOR SCREENING MAMMOGRAM: Primary | ICD-10-CM

## 2024-11-04 ENCOUNTER — HOSPITAL ENCOUNTER (OUTPATIENT)
Dept: MAMMOGRAPHY | Facility: HOSPITAL | Age: 79
Discharge: HOME | End: 2024-11-04
Admitting: FAMILY MEDICINE
Payer: COMMERCIAL

## 2024-11-04 DIAGNOSIS — Z12.31 VISIT FOR SCREENING MAMMOGRAM: ICD-10-CM

## 2024-11-04 PROCEDURE — 77063 BREAST TOMOSYNTHESIS BI: CPT

## 2024-11-04 PROCEDURE — 77067 SCR MAMMO BI INCL CAD: CPT

## 2025-01-14 ENCOUNTER — HOSPITAL ENCOUNTER (OUTPATIENT)
Facility: HOSPITAL | Age: 80
Setting detail: HOSPITAL OUTPATIENT SURGERY
End: 2025-01-14
Attending: OBSTETRICS & GYNECOLOGY | Admitting: OBSTETRICS & GYNECOLOGY
Payer: COMMERCIAL

## 2025-02-27 VITALS — BODY MASS INDEX: 27.38 KG/M2 | WEIGHT: 145 LBS | HEIGHT: 61 IN

## 2025-02-27 RX ORDER — GLIPIZIDE 5 MG/1
5 TABLET, FILM COATED, EXTENDED RELEASE ORAL DAILY
COMMUNITY

## 2025-03-05 ENCOUNTER — LAB (OUTPATIENT)
Dept: LAB | Facility: HOSPITAL | Age: 80
End: 2025-03-05
Payer: COMMERCIAL

## 2025-03-05 ENCOUNTER — HOSPITAL ENCOUNTER (OUTPATIENT)
Dept: CARDIOLOGY | Facility: HOSPITAL | Age: 80
Discharge: HOME | End: 2025-03-05
Payer: COMMERCIAL

## 2025-03-05 LAB
ANION GAP SERPL CALCULATED.3IONS-SCNC: 13.9 MMOL/L (ref 5–15)
BASOPHILS # BLD AUTO: 0.03 10*3/MM3 (ref 0–0.2)
BASOPHILS NFR BLD AUTO: 0.4 % (ref 0–1.5)
BILIRUB UR QL STRIP: NEGATIVE
BUN SERPL-MCNC: 12 MG/DL (ref 8–23)
BUN/CREAT SERPL: 18.8 (ref 7–25)
CALCIUM SPEC-SCNC: 9.6 MG/DL (ref 8.6–10.5)
CHLORIDE SERPL-SCNC: 103 MMOL/L (ref 98–107)
CLARITY UR: CLEAR
CO2 SERPL-SCNC: 22.1 MMOL/L (ref 22–29)
COLOR UR: YELLOW
CREAT SERPL-MCNC: 0.64 MG/DL (ref 0.57–1)
DEPRECATED RDW RBC AUTO: 39.9 FL (ref 37–54)
EGFRCR SERPLBLD CKD-EPI 2021: 91.2 ML/MIN/1.73
EOSINOPHIL # BLD AUTO: 0.12 10*3/MM3 (ref 0–0.4)
EOSINOPHIL NFR BLD AUTO: 1.5 % (ref 0.3–6.2)
ERYTHROCYTE [DISTWIDTH] IN BLOOD BY AUTOMATED COUNT: 13.1 % (ref 12.3–15.4)
GLUCOSE SERPL-MCNC: 241 MG/DL (ref 65–99)
GLUCOSE UR STRIP-MCNC: ABNORMAL MG/DL
HBA1C MFR BLD: 10.9 % (ref 4.8–5.6)
HCT VFR BLD AUTO: 41.4 % (ref 34–46.6)
HGB BLD-MCNC: 13.7 G/DL (ref 12–15.9)
HGB UR QL STRIP.AUTO: NEGATIVE
HOLD SPECIMEN: NORMAL
IMM GRANULOCYTES # BLD AUTO: 0.03 10*3/MM3 (ref 0–0.05)
IMM GRANULOCYTES NFR BLD AUTO: 0.4 % (ref 0–0.5)
KETONES UR QL STRIP: NEGATIVE
LEUKOCYTE ESTERASE UR QL STRIP.AUTO: NEGATIVE
LYMPHOCYTES # BLD AUTO: 2.27 10*3/MM3 (ref 0.7–3.1)
LYMPHOCYTES NFR BLD AUTO: 28.8 % (ref 19.6–45.3)
MCH RBC QN AUTO: 28.2 PG (ref 26.6–33)
MCHC RBC AUTO-ENTMCNC: 33.1 G/DL (ref 31.5–35.7)
MCV RBC AUTO: 85.2 FL (ref 79–97)
MONOCYTES # BLD AUTO: 0.45 10*3/MM3 (ref 0.1–0.9)
MONOCYTES NFR BLD AUTO: 5.7 % (ref 5–12)
NEUTROPHILS NFR BLD AUTO: 4.97 10*3/MM3 (ref 1.7–7)
NEUTROPHILS NFR BLD AUTO: 63.2 % (ref 42.7–76)
NITRITE UR QL STRIP: NEGATIVE
NRBC BLD AUTO-RTO: 0 /100 WBC (ref 0–0.2)
PH UR STRIP.AUTO: 6 [PH] (ref 5–8)
PLATELET # BLD AUTO: 217 10*3/MM3 (ref 140–450)
PMV BLD AUTO: 10.9 FL (ref 6–12)
POTASSIUM SERPL-SCNC: 3.4 MMOL/L (ref 3.5–5.2)
PROT UR QL STRIP: NEGATIVE
QT INTERVAL: 360 MS
QTC INTERVAL: 457 MS
RBC # BLD AUTO: 4.86 10*6/MM3 (ref 3.77–5.28)
SODIUM SERPL-SCNC: 139 MMOL/L (ref 136–145)
SP GR UR STRIP: 1.01 (ref 1–1.03)
UROBILINOGEN UR QL STRIP: ABNORMAL
WBC NRBC COR # BLD AUTO: 7.87 10*3/MM3 (ref 3.4–10.8)

## 2025-03-05 PROCEDURE — 80048 BASIC METABOLIC PNL TOTAL CA: CPT | Performed by: OBSTETRICS & GYNECOLOGY

## 2025-03-05 PROCEDURE — 36415 COLL VENOUS BLD VENIPUNCTURE: CPT | Performed by: OBSTETRICS & GYNECOLOGY

## 2025-03-05 PROCEDURE — 93005 ELECTROCARDIOGRAM TRACING: CPT | Performed by: OBSTETRICS & GYNECOLOGY

## 2025-03-05 PROCEDURE — 83036 HEMOGLOBIN GLYCOSYLATED A1C: CPT | Performed by: OBSTETRICS & GYNECOLOGY

## 2025-03-05 PROCEDURE — 85025 COMPLETE CBC W/AUTO DIFF WBC: CPT | Performed by: OBSTETRICS & GYNECOLOGY

## 2025-03-05 PROCEDURE — 81003 URINALYSIS AUTO W/O SCOPE: CPT | Performed by: OBSTETRICS & GYNECOLOGY

## 2025-03-06 ENCOUNTER — TELEPHONE (OUTPATIENT)
Dept: CARDIOLOGY | Facility: CLINIC | Age: 80
End: 2025-03-06
Payer: COMMERCIAL

## 2025-03-06 NOTE — PAT
Abnormal EKG, needs to see Dr. Pretty before can get cardiac clearance, Dr. Gilman's office notified

## 2025-03-06 NOTE — TELEPHONE ENCOUNTER
We received an abnormal EKG for patient.   Called preop, spoke with Ivet she is aware patient has not been seen in five years, she is going to contact surgeon and let them know surgery will need to be cancelled.       Called patient, let her know that we cannot clear her for surgery until she is seen, she did not want to get scheduled right now, patient stated she would like to think about it and give us a call back.

## 2025-03-12 ENCOUNTER — OFFICE VISIT (OUTPATIENT)
Dept: CARDIOLOGY | Facility: CLINIC | Age: 80
End: 2025-03-12
Payer: COMMERCIAL

## 2025-03-12 VITALS
HEIGHT: 61 IN | RESPIRATION RATE: 16 BRPM | DIASTOLIC BLOOD PRESSURE: 70 MMHG | SYSTOLIC BLOOD PRESSURE: 126 MMHG | WEIGHT: 140 LBS | OXYGEN SATURATION: 98 % | BODY MASS INDEX: 26.43 KG/M2 | HEART RATE: 73 BPM

## 2025-03-12 DIAGNOSIS — R94.31 ABNORMAL EKG: ICD-10-CM

## 2025-03-12 DIAGNOSIS — I10 ESSENTIAL HYPERTENSION: Primary | ICD-10-CM

## 2025-03-12 DIAGNOSIS — E78.2 MIXED HYPERLIPIDEMIA: ICD-10-CM

## 2025-03-12 DIAGNOSIS — E11.649 TYPE 2 DIABETES MELLITUS WITH HYPOGLYCEMIA WITHOUT COMA, WITHOUT LONG-TERM CURRENT USE OF INSULIN: ICD-10-CM

## 2025-03-12 DIAGNOSIS — Z01.818 PREOPERATIVE CLEARANCE: ICD-10-CM

## 2025-03-26 ENCOUNTER — HOSPITAL ENCOUNTER (OUTPATIENT)
Dept: NUCLEAR MEDICINE | Facility: HOSPITAL | Age: 80
Discharge: HOME | End: 2025-03-26
Payer: COMMERCIAL

## 2025-03-26 ENCOUNTER — HOSPITAL ENCOUNTER (OUTPATIENT)
Dept: CARDIOLOGY | Facility: HOSPITAL | Age: 80
Discharge: HOME | End: 2025-03-26
Admitting: INTERNAL MEDICINE
Payer: COMMERCIAL

## 2025-03-26 DIAGNOSIS — E11.649 TYPE 2 DIABETES MELLITUS WITH HYPOGLYCEMIA WITHOUT COMA, WITHOUT LONG-TERM CURRENT USE OF INSULIN: ICD-10-CM

## 2025-03-26 DIAGNOSIS — R94.31 ABNORMAL EKG: ICD-10-CM

## 2025-03-26 DIAGNOSIS — E78.2 MIXED HYPERLIPIDEMIA: ICD-10-CM

## 2025-03-26 DIAGNOSIS — I10 ESSENTIAL HYPERTENSION: ICD-10-CM

## 2025-03-26 DIAGNOSIS — Z01.818 PREOPERATIVE CLEARANCE: ICD-10-CM

## 2025-03-26 LAB
AORTIC DIMENSIONLESS INDEX: 0.61 (DI)
AV MEAN PRESS GRAD SYS DOP V1V2: 7 MMHG
AV VMAX SYS DOP: 179 CM/SEC
BH CV ECHO LEFT VENTRICLE GLOBAL LONGITUDINAL STRAIN: -18.6 %
BH CV ECHO MEAS - AI P1/2T: 232.1 MSEC
BH CV ECHO MEAS - AO MAX PG: 12.8 MMHG
BH CV ECHO MEAS - AO V2 VTI: 35.9 CM
BH CV ECHO MEAS - AVA(I,D): 1.92 CM2
BH CV ECHO MEAS - EDV(CUBED): 85.2 ML
BH CV ECHO MEAS - EDV(MOD-SP2): 72.8 ML
BH CV ECHO MEAS - EDV(MOD-SP4): 81.8 ML
BH CV ECHO MEAS - EF(MOD-SP2): 62.4 %
BH CV ECHO MEAS - EF(MOD-SP4): 63.2 %
BH CV ECHO MEAS - ESV(CUBED): 24.4 ML
BH CV ECHO MEAS - ESV(MOD-SP2): 27.4 ML
BH CV ECHO MEAS - ESV(MOD-SP4): 30.1 ML
BH CV ECHO MEAS - FS: 34.1 %
BH CV ECHO MEAS - IVS/LVPW: 1.13 CM
BH CV ECHO MEAS - IVSD: 0.9 CM
BH CV ECHO MEAS - LA DIMENSION: 3.6 CM
BH CV ECHO MEAS - LAT PEAK E' VEL: 7 CM/SEC
BH CV ECHO MEAS - LV DIASTOLIC VOL/BSA (35-75): 56.3 CM2
BH CV ECHO MEAS - LV MASS(C)D: 118.6 GRAMS
BH CV ECHO MEAS - LV MAX PG: 4.9 MMHG
BH CV ECHO MEAS - LV MEAN PG: 3 MMHG
BH CV ECHO MEAS - LV SYSTOLIC VOL/BSA (12-30): 20.7 CM2
BH CV ECHO MEAS - LV V1 MAX: 111 CM/SEC
BH CV ECHO MEAS - LV V1 VTI: 21.9 CM
BH CV ECHO MEAS - LVIDD: 4.4 CM
BH CV ECHO MEAS - LVIDS: 2.9 CM
BH CV ECHO MEAS - LVOT AREA: 3.1 CM2
BH CV ECHO MEAS - LVOT DIAM: 2 CM
BH CV ECHO MEAS - LVPWD: 0.8 CM
BH CV ECHO MEAS - MED PEAK E' VEL: 6.7 CM/SEC
BH CV ECHO MEAS - MR MAX PG: 85.7 MMHG
BH CV ECHO MEAS - MR MAX VEL: 463 CM/SEC
BH CV ECHO MEAS - MV A DUR: 0.14 SEC
BH CV ECHO MEAS - MV A MAX VEL: 140 CM/SEC
BH CV ECHO MEAS - MV DEC SLOPE: 492 CM/SEC2
BH CV ECHO MEAS - MV DEC TIME: 0.17 SEC
BH CV ECHO MEAS - MV E MAX VEL: 97.5 CM/SEC
BH CV ECHO MEAS - MV E/A: 0.7
BH CV ECHO MEAS - MV MAX PG: 13.1 MMHG
BH CV ECHO MEAS - MV MEAN PG: 5 MMHG
BH CV ECHO MEAS - MV P1/2T: 79.2 MSEC
BH CV ECHO MEAS - MV V2 VTI: 43.7 CM
BH CV ECHO MEAS - MVA(P1/2T): 2.8 CM2
BH CV ECHO MEAS - MVA(VTI): 1.57 CM2
BH CV ECHO MEAS - PA ACC TIME: 0.15 SEC
BH CV ECHO MEAS - PA V2 MAX: 131 CM/SEC
BH CV ECHO MEAS - PULM A REVS DUR: 0.13 SEC
BH CV ECHO MEAS - PULM A REVS VEL: 36.9 CM/SEC
BH CV ECHO MEAS - PULM DIAS VEL: 33 CM/SEC
BH CV ECHO MEAS - PULM S/D: 1.9
BH CV ECHO MEAS - PULM SYS VEL: 62.6 CM/SEC
BH CV ECHO MEAS - RAP SYSTOLE: 3 MMHG
BH CV ECHO MEAS - RV MAX PG: 1.98 MMHG
BH CV ECHO MEAS - RV V1 MAX: 70.3 CM/SEC
BH CV ECHO MEAS - RV V1 VTI: 15.1 CM
BH CV ECHO MEAS - RVSP: 30.7 MMHG
BH CV ECHO MEAS - SV(LVOT): 68.8 ML
BH CV ECHO MEAS - SV(MOD-SP2): 45.4 ML
BH CV ECHO MEAS - SV(MOD-SP4): 51.7 ML
BH CV ECHO MEAS - SVI(LVOT): 47.3 ML/M2
BH CV ECHO MEAS - SVI(MOD-SP2): 31.2 ML/M2
BH CV ECHO MEAS - SVI(MOD-SP4): 35.6 ML/M2
BH CV ECHO MEAS - TAPSE (>1.6): 2.39 CM
BH CV ECHO MEAS - TR MAX PG: 27.7 MMHG
BH CV ECHO MEAS - TR MAX VEL: 263 CM/SEC
BH CV ECHO MEASUREMENTS AVERAGE E/E' RATIO: 14.23
BH CV XLRA - TDI S': 15.9 CM/SEC
LEFT ATRIUM VOLUME INDEX: 33.9 ML/M2
LV EF BIPLANE MOD: 62.4 %
SINUS: 2.8 CM
STJ: 2 CM

## 2025-03-26 PROCEDURE — 93017 CV STRESS TEST TRACING ONLY: CPT

## 2025-03-26 PROCEDURE — 34310000005 TECHNETIUM TETROFOSMIN KIT: Performed by: INTERNAL MEDICINE

## 2025-03-26 PROCEDURE — A9502 TC99M TETROFOSMIN: HCPCS | Performed by: INTERNAL MEDICINE

## 2025-03-26 PROCEDURE — 93356 MYOCRD STRAIN IMG SPCKL TRCK: CPT

## 2025-03-26 PROCEDURE — 93306 TTE W/DOPPLER COMPLETE: CPT

## 2025-03-26 PROCEDURE — 78452 HT MUSCLE IMAGE SPECT MULT: CPT

## 2025-03-26 RX ADMIN — TETROFOSMIN 1 DOSE: 1.38 INJECTION, POWDER, LYOPHILIZED, FOR SOLUTION INTRAVENOUS at 09:50

## 2025-03-29 LAB
BH CV REST NUCLEAR ISOTOPE DOSE: 11 MCI
BH CV STRESS BP STAGE 1: NORMAL
BH CV STRESS BP STAGE 2: NORMAL
BH CV STRESS DURATION MIN STAGE 1: 3
BH CV STRESS DURATION MIN STAGE 2: 3
BH CV STRESS DURATION SEC STAGE 1: 0
BH CV STRESS DURATION SEC STAGE 2: 0
BH CV STRESS GRADE STAGE 1: 10
BH CV STRESS GRADE STAGE 2: 12
BH CV STRESS HR STAGE 1: 93
BH CV STRESS HR STAGE 2: 132
BH CV STRESS METS STAGE 1: 5
BH CV STRESS METS STAGE 2: 7.5
BH CV STRESS NUCLEAR ISOTOPE DOSE: 33 MCI
BH CV STRESS PROTOCOL 1: NORMAL
BH CV STRESS RECOVERY BP: NORMAL MMHG
BH CV STRESS RECOVERY HR: 94 BPM
BH CV STRESS SPEED STAGE 1: 1.7
BH CV STRESS SPEED STAGE 2: 2.5
BH CV STRESS STAGE 1: 1
BH CV STRESS STAGE 2: 2
MAXIMAL PREDICTED HEART RATE: 143 BPM
PERCENT MAX PREDICTED HR: 92.31 %
SPECT HRT GATED+EF W RNC IV: 89 %
STRESS BASELINE BP: NORMAL MMHG
STRESS BASELINE HR: 81 BPM
STRESS PERCENT HR: 109 %
STRESS POST ESTIMATED WORKLOAD: 4.6 METS
STRESS POST EXERCISE DUR MIN: 6 MIN
STRESS POST EXERCISE DUR SEC: 1 SEC
STRESS POST PEAK BP: NORMAL MMHG
STRESS POST PEAK HR: 132 BPM
STRESS TARGET HR: 122 BPM
